# Patient Record
Sex: MALE | Race: WHITE | Employment: FULL TIME | URBAN - METROPOLITAN AREA
[De-identification: names, ages, dates, MRNs, and addresses within clinical notes are randomized per-mention and may not be internally consistent; named-entity substitution may affect disease eponyms.]

---

## 2018-04-10 ENCOUNTER — APPOINTMENT (OUTPATIENT)
Dept: GENERAL RADIOLOGY | Facility: CLINIC | Age: 23
End: 2018-04-10
Attending: EMERGENCY MEDICINE

## 2018-04-10 ENCOUNTER — HOSPITAL ENCOUNTER (EMERGENCY)
Facility: CLINIC | Age: 23
Discharge: HOME | End: 2018-04-11
Attending: EMERGENCY MEDICINE | Admitting: EMERGENCY MEDICINE

## 2018-04-10 DIAGNOSIS — F33.0 MILD EPISODE OF RECURRENT MAJOR DEPRESSIVE DISORDER (H): ICD-10-CM

## 2018-04-10 LAB
ALBUMIN SERPL-MCNC: 4.2 G/DL (ref 3.4–5)
ALBUMIN UR-MCNC: 10 MG/DL
ALP SERPL-CCNC: 105 U/L (ref 40–150)
ALT SERPL W P-5'-P-CCNC: 32 U/L (ref 0–70)
AMPHETAMINES UR QL SCN: POSITIVE
ANION GAP SERPL CALCULATED.3IONS-SCNC: 7 MMOL/L (ref 3–14)
APAP SERPL-MCNC: <2 MG/L (ref 10–20)
APPEARANCE UR: CLEAR
AST SERPL W P-5'-P-CCNC: 24 U/L (ref 0–45)
BARBITURATES UR QL: NEGATIVE
BASOPHILS # BLD AUTO: 0 10E9/L (ref 0–0.2)
BASOPHILS NFR BLD AUTO: 0.1 %
BENZODIAZ UR QL: NEGATIVE
BILIRUB SERPL-MCNC: 0.4 MG/DL (ref 0.2–1.3)
BILIRUB UR QL STRIP: NEGATIVE
BUN SERPL-MCNC: 14 MG/DL (ref 7–30)
CALCIUM SERPL-MCNC: 9.3 MG/DL (ref 8.5–10.1)
CANNABINOIDS UR QL SCN: POSITIVE
CHLORIDE SERPL-SCNC: 104 MMOL/L (ref 94–109)
CK SERPL-CCNC: 312 U/L (ref 30–300)
CK SERPL-CCNC: 417 U/L (ref 30–300)
CO2 SERPL-SCNC: 29 MMOL/L (ref 20–32)
COCAINE UR QL: NEGATIVE
COLOR UR AUTO: YELLOW
CREAT SERPL-MCNC: 0.77 MG/DL (ref 0.66–1.25)
DIFFERENTIAL METHOD BLD: NORMAL
EOSINOPHIL # BLD AUTO: 0 10E9/L (ref 0–0.7)
EOSINOPHIL NFR BLD AUTO: 0.4 %
ERYTHROCYTE [DISTWIDTH] IN BLOOD BY AUTOMATED COUNT: 13 % (ref 10–15)
ETHANOL SERPL-MCNC: <0.01 G/DL
ETHANOL UR QL SCN: NEGATIVE
GFR SERPL CREATININE-BSD FRML MDRD: >90 ML/MIN/1.7M2
GLUCOSE SERPL-MCNC: 88 MG/DL (ref 70–99)
GLUCOSE UR STRIP-MCNC: NEGATIVE MG/DL
HCT VFR BLD AUTO: 50.4 % (ref 40–53)
HGB BLD-MCNC: 17.2 G/DL (ref 13.3–17.7)
HGB UR QL STRIP: NEGATIVE
IMM GRANULOCYTES # BLD: 0 10E9/L (ref 0–0.4)
IMM GRANULOCYTES NFR BLD: 0.3 %
KETONES UR STRIP-MCNC: NEGATIVE MG/DL
LEUKOCYTE ESTERASE UR QL STRIP: NEGATIVE
LYMPHOCYTES # BLD AUTO: 1.3 10E9/L (ref 0.8–5.3)
LYMPHOCYTES NFR BLD AUTO: 18 %
MCH RBC QN AUTO: 31.5 PG (ref 26.5–33)
MCHC RBC AUTO-ENTMCNC: 34.1 G/DL (ref 31.5–36.5)
MCV RBC AUTO: 92 FL (ref 78–100)
MONOCYTES # BLD AUTO: 0.4 10E9/L (ref 0–1.3)
MONOCYTES NFR BLD AUTO: 6.3 %
NEUTROPHILS # BLD AUTO: 5.3 10E9/L (ref 1.6–8.3)
NEUTROPHILS NFR BLD AUTO: 74.9 %
NITRATE UR QL: NEGATIVE
NRBC # BLD AUTO: 0 10*3/UL
NRBC BLD AUTO-RTO: 0 /100
OPIATES UR QL SCN: NEGATIVE
PH UR STRIP: 6.5 PH (ref 5–7)
PLATELET # BLD AUTO: 245 10E9/L (ref 150–450)
POTASSIUM SERPL-SCNC: 4 MMOL/L (ref 3.4–5.3)
PROT SERPL-MCNC: 8.2 G/DL (ref 6.8–8.8)
RBC # BLD AUTO: 5.46 10E12/L (ref 4.4–5.9)
RBC #/AREA URNS AUTO: 1 /HPF (ref 0–2)
SALICYLATES SERPL-MCNC: <2 MG/DL
SODIUM SERPL-SCNC: 140 MMOL/L (ref 133–144)
SOURCE: ABNORMAL
SP GR UR STRIP: 1.02 (ref 1–1.03)
UROBILINOGEN UR STRIP-MCNC: NORMAL MG/DL (ref 0–2)
WBC # BLD AUTO: 7 10E9/L (ref 4–11)
WBC #/AREA URNS AUTO: 1 /HPF (ref 0–5)

## 2018-04-10 PROCEDURE — 87591 N.GONORRHOEAE DNA AMP PROB: CPT | Performed by: EMERGENCY MEDICINE

## 2018-04-10 PROCEDURE — 80329 ANALGESICS NON-OPIOID 1 OR 2: CPT | Performed by: EMERGENCY MEDICINE

## 2018-04-10 PROCEDURE — 87491 CHLMYD TRACH DNA AMP PROBE: CPT | Performed by: EMERGENCY MEDICINE

## 2018-04-10 PROCEDURE — 71045 X-RAY EXAM CHEST 1 VIEW: CPT

## 2018-04-10 PROCEDURE — 99285 EMERGENCY DEPT VISIT HI MDM: CPT | Mod: 25 | Performed by: EMERGENCY MEDICINE

## 2018-04-10 PROCEDURE — 93005 ELECTROCARDIOGRAM TRACING: CPT | Performed by: EMERGENCY MEDICINE

## 2018-04-10 PROCEDURE — 80307 DRUG TEST PRSMV CHEM ANLYZR: CPT | Performed by: EMERGENCY MEDICINE

## 2018-04-10 PROCEDURE — 80320 DRUG SCREEN QUANTALCOHOLS: CPT | Performed by: EMERGENCY MEDICINE

## 2018-04-10 PROCEDURE — 96361 HYDRATE IV INFUSION ADD-ON: CPT | Performed by: EMERGENCY MEDICINE

## 2018-04-10 PROCEDURE — 25000128 H RX IP 250 OP 636: Performed by: EMERGENCY MEDICINE

## 2018-04-10 PROCEDURE — 80053 COMPREHEN METABOLIC PANEL: CPT | Performed by: EMERGENCY MEDICINE

## 2018-04-10 PROCEDURE — 81001 URINALYSIS AUTO W/SCOPE: CPT | Performed by: EMERGENCY MEDICINE

## 2018-04-10 PROCEDURE — 93010 ELECTROCARDIOGRAM REPORT: CPT | Mod: Z6 | Performed by: EMERGENCY MEDICINE

## 2018-04-10 PROCEDURE — 99284 EMERGENCY DEPT VISIT MOD MDM: CPT | Mod: 25 | Performed by: EMERGENCY MEDICINE

## 2018-04-10 PROCEDURE — 85025 COMPLETE CBC W/AUTO DIFF WBC: CPT | Performed by: EMERGENCY MEDICINE

## 2018-04-10 PROCEDURE — 82550 ASSAY OF CK (CPK): CPT | Performed by: EMERGENCY MEDICINE

## 2018-04-10 PROCEDURE — 96360 HYDRATION IV INFUSION INIT: CPT | Performed by: EMERGENCY MEDICINE

## 2018-04-10 RX ADMIN — SODIUM CHLORIDE 1000 ML: 9 INJECTION, SOLUTION INTRAVENOUS at 16:43

## 2018-04-10 RX ADMIN — SODIUM CHLORIDE 1000 ML: 9 INJECTION, SOLUTION INTRAVENOUS at 17:38

## 2018-04-10 ASSESSMENT — ENCOUNTER SYMPTOMS
COLOR CHANGE: 0
ARTHRALGIAS: 0
DIFFICULTY URINATING: 0
HEADACHES: 0
DYSPHORIC MOOD: 1
FEVER: 0
SHORTNESS OF BREATH: 0
EYE REDNESS: 0
NECK STIFFNESS: 0
ABDOMINAL PAIN: 0
CONFUSION: 0

## 2018-04-10 NOTE — ED PROVIDER NOTES
History     Chief Complaint   Patient presents with     Suicide Attempt     About 10 pills of D-amphetamine 30 mg and about 3 of Methylphenidate 20  mg tablets. Took pills around 3 pm today.      HPI  Hermes Allen is a 22 year old male who to the emergency department via EMS after intentional ingestion as a suicide attempt of 10 d-amphetamine 30 mg and 3 methylphenidate 20 mg.  Patient states he took the ingestion approximately 1-1/2 hours prior to arrival here in the emergency department.  He states that he was texting with his ex-girlfriend and indicated that he was going to harm himself.  She subsequently called his brother who checked on the patient and called EMS.  Patient states that his ingestion was a suicide attempt.  He states he also took 30 melatonin tablets last night to in the morning as a suicide attempt as well.  He states he slept for approximately 8 hours.  He reports ongoing and worsening symptoms of depression for the past 2 months.  He cites relationship problems including breakup with his girlfriend and work-related problems including transportation to work issues as ongoing stressors in his life.  He does not currently have any outpatient mental health providers.  He denies taking any medications for depression currently although he did 2 years ago.  Patient repairs that he currently smokes cigarettes, babies, and smokes marijuana.  He denies other drug use.  He denies alcohol use.  Patient states he has had a cough productive of sputum for the past 2 months.  He denies any fever.  He denies chest pain and dyspnea.  Patient denies any paranoia or hallucinations.  Denies any nausea or vomiting.  He denies abdominal pain.  He denies any recent fall or injury.    The patient is also requesting evaluation for chlamydia.  He denies any symptoms of dysuria, penile pain or lesions, or penile discharge but does state that a previous sexual partner of his recently told him that she was positive for  chlamydia.    I have reviewed the Medications, Allergies, Past Medical and Surgical History, and Social History in the Epic system.    Review of Systems   Constitutional: Negative for fever.   HENT: Negative for congestion.    Eyes: Negative for redness.   Respiratory: Negative for shortness of breath.    Cardiovascular: Negative for chest pain.   Gastrointestinal: Negative for abdominal pain.   Genitourinary: Negative for difficulty urinating.   Musculoskeletal: Negative for arthralgias and neck stiffness.   Skin: Negative for color change.   Neurological: Negative for headaches.   Psychiatric/Behavioral: Positive for dysphoric mood, self-injury and suicidal ideas. Negative for confusion.   All other systems reviewed and are negative.      Physical Exam   BP: (!) 141/92  Pulse: 88  Heart Rate: 77  Temp: 97.9  F (36.6  C)  Resp: 16  SpO2: 98 %      Physical Exam   Constitutional: He is oriented to person, place, and time. He appears well-developed and well-nourished. No distress.   HENT:   Head: Normocephalic and atraumatic.   Mouth/Throat: Oropharynx is clear and moist. No oropharyngeal exudate.   Eyes: Pupils are equal, round, and reactive to light. No scleral icterus.   Neck: Normal range of motion. Neck supple.   Cardiovascular: Normal rate, regular rhythm, normal heart sounds and intact distal pulses.    Pulmonary/Chest: Effort normal and breath sounds normal. No respiratory distress.   Abdominal: Soft. Bowel sounds are normal. There is no tenderness.   Musculoskeletal: Normal range of motion. He exhibits no edema or tenderness.   Neurological: He is alert and oriented to person, place, and time. He has normal strength. He displays no tremor. No cranial nerve deficit. He exhibits normal muscle tone. Coordination normal.   Skin: Skin is warm. No rash noted. He is not diaphoretic.   Psychiatric: His speech is normal. His affect is blunt. He is withdrawn. He expresses suicidal ideation. He expresses suicidal  plans.   Nursing note and vitals reviewed.        ED Course     ED Course     Pill bottles examined- d-amphetamine bottle label smudged but appears to be XR.    Procedures             EKG Interpretation:      Interpreted by YONATAN MOJICA MD  Time reviewed: 1630  Symptoms at time of EKG: ingestion, asymptomatic   Rhythm: normal sinus with sinus arrythmia  Rate: 92  Axis: normal  Ectopy: none  Conduction: normal  ST Segments/ T Waves: No ST-T wave changes  Q Waves: none  Comparison to prior: No old EKG available    Clinical Impression: normal EKG    Results for orders placed or performed during the hospital encounter of 04/10/18 (from the past 24 hour(s))   EKG 12 lead   Result Value Ref Range    Interpretation ECG Click View Image link to view waveform and result    CBC with platelets differential   Result Value Ref Range    WBC 7.0 4.0 - 11.0 10e9/L    RBC Count 5.46 4.4 - 5.9 10e12/L    Hemoglobin 17.2 13.3 - 17.7 g/dL    Hematocrit 50.4 40.0 - 53.0 %    MCV 92 78 - 100 fl    MCH 31.5 26.5 - 33.0 pg    MCHC 34.1 31.5 - 36.5 g/dL    RDW 13.0 10.0 - 15.0 %    Platelet Count 245 150 - 450 10e9/L    Diff Method Automated Method     % Neutrophils 74.9 %    % Lymphocytes 18.0 %    % Monocytes 6.3 %    % Eosinophils 0.4 %    % Basophils 0.1 %    % Immature Granulocytes 0.3 %    Nucleated RBCs 0 0 /100    Absolute Neutrophil 5.3 1.6 - 8.3 10e9/L    Absolute Lymphocytes 1.3 0.8 - 5.3 10e9/L    Absolute Monocytes 0.4 0.0 - 1.3 10e9/L    Absolute Eosinophils 0.0 0.0 - 0.7 10e9/L    Absolute Basophils 0.0 0.0 - 0.2 10e9/L    Abs Immature Granulocytes 0.0 0 - 0.4 10e9/L    Absolute Nucleated RBC 0.0    Comprehensive metabolic panel   Result Value Ref Range    Sodium 140 133 - 144 mmol/L    Potassium 4.0 3.4 - 5.3 mmol/L    Chloride 104 94 - 109 mmol/L    Carbon Dioxide 29 20 - 32 mmol/L    Anion Gap 7 3 - 14 mmol/L    Glucose 88 70 - 99 mg/dL    Urea Nitrogen 14 7 - 30 mg/dL    Creatinine 0.77 0.66 - 1.25 mg/dL    GFR Estimate  >90 >60 mL/min/1.7m2    GFR Estimate If Black >90 >60 mL/min/1.7m2    Calcium 9.3 8.5 - 10.1 mg/dL    Bilirubin Total 0.4 0.2 - 1.3 mg/dL    Albumin 4.2 3.4 - 5.0 g/dL    Protein Total 8.2 6.8 - 8.8 g/dL    Alkaline Phosphatase 105 40 - 150 U/L    ALT 32 0 - 70 U/L    AST 24 0 - 45 U/L   Alcohol level blood   Result Value Ref Range    Ethanol g/dL <0.01 <0.01 g/dL   Acetaminophen level   Result Value Ref Range    Acetaminophen Level <2 mg/L   Salicylate level   Result Value Ref Range    Salicylate Level <2 mg/dL   CK total   Result Value Ref Range    CK Total 417 (H) 30 - 300 U/L   Chest  XR, 1 view portable    Narrative    CHEST ONE VIEW PORTABLE   4/10/2018 5:01 PM     HISTORY: Cough.    COMPARISON: None.      Impression    IMPRESSION: Normal.    THA RAGLAND MD   Drug abuse screen 6 urine (tox)   Result Value Ref Range    Amphetamine Qual Urine Positive (A) NEG^Negative    Barbiturates Qual Urine Negative NEG^Negative    Benzodiazepine Qual Urine Negative NEG^Negative    Cannabinoids Qual Urine Positive (A) NEG^Negative    Cocaine Qual Urine Negative NEG^Negative    Ethanol Qual Urine Negative NEG^Negative    Opiates Qualitative Urine Negative NEG^Negative   UA with Microscopic reflex to Culture   Result Value Ref Range    Color Urine Yellow     Appearance Urine Clear     Glucose Urine Negative NEG^Negative mg/dL    Bilirubin Urine Negative NEG^Negative    Ketones Urine Negative NEG^Negative mg/dL    Specific Gravity Urine 1.018 1.003 - 1.035    Blood Urine Negative NEG^Negative    pH Urine 6.5 5.0 - 7.0 pH    Protein Albumin Urine 10 (A) NEG^Negative mg/dL    Urobilinogen mg/dL Normal 0.0 - 2.0 mg/dL    Nitrite Urine Negative NEG^Negative    Leukocyte Esterase Urine Negative NEG^Negative    Source Unspecified Urine     WBC Urine 1 0 - 5 /HPF    RBC Urine 1 0 - 2 /HPF   CK total   Result Value Ref Range    CK Total 312 (H) 30 - 300 U/L        Medications   nicotine polacrilex (NICORETTE) gum 2 mg (not  administered)   0.9% sodium chloride BOLUS (0 mLs Intravenous Stopped 4/10/18 1729)   0.9% sodium chloride BOLUS (0 mLs Intravenous Stopped 4/10/18 1848)          Critical Care time:    10:17 PM Patient reassessed.  He reports feeling mildly anxious but denies any alteration in his thought process.  He denies any nausea or vomiting.  No chest pain.  Patient is awake and alert.  He is conversant.  He has normal speech.  He has normal tone.  No hyperreflexia.  Patient states that he would like to get the help he needs but does not necessarily feel like he has ongoing suicide ideation.  [facility] assessment requested.    Seen by [facility] - see note for complete details.       Assessments & Plan (with Medical Decision Making)   22 year old male with history of depression to the emergency department after nontoxic ingestion.  The patient did not ever manifest clinical signs or symptoms consistent with significant stimulant ingestion.  He did have a mildly elevated CK level initially which improved with time and IV fluids.  The patient denied ongoing suicidal ideations upon presentation to the emergency department and maintained that he was not suicidal throughout his emergency department stay.  He was medically cleared after 7 hour period of observation.  He was seen by the [facility] .  The patient continues to deny ongoing suicide ideations.  Attempts to obtain his insurance information by his mother thus far has been unsuccessful.  Plan is for patient to be referred for day treatment.  She will be discharged from the emergency department.  Mental health will contact him tomorrow to help facilitate outpatient follow-up.    I have reviewed the nursing notes.    I have reviewed the findings, diagnosis, plan and need for follow up with the patient.    New Prescriptions    No medications on file       Final diagnoses:   Drug ingestion, undetermined intent, initial encounter   Mild episode of recurrent major depressive  disorder (H)       4/10/2018   [facility], [place], EMERGENCY DEPARTMENT     Anthony Richardson MD  04/11/18 0006       Anthony Richardson MD  04/11/18 0012       Anthony Richardson MD  04/11/18 0013

## 2018-04-10 NOTE — ED AVS SNAPSHOT
[facility], Emergency Department    [address]    [place] [address]    Phone:  [phone]    Fax:  [phone]                                       Hermes Allen   MRN: [id number]    Department:  [facility], Emergency Department   Date of Visit:  4/10/2018           Patient Information     Date Of Birth          1995        Your diagnoses for this visit were:     Drug ingestion, undetermined intent, initial encounter     Mild episode of recurrent major depressive disorder (H)        You were seen by Anthony Richardson MD.        Discharge Instructions       Follow-up for Day Treatment Intake- Mental Health Intake will contact you tomorrow to help arrange.    Return if you feel unsafe or for other concerns.    24 Hour Appointment Hotline       To make an appointment at any [facility] clinic, call [phone] ([phone]). If you don't have a family doctor or clinic, we will help you find one. [facility] clinics are conveniently located to serve the needs of you and your family.             Review of your medicines      Our records show that you are taking the medicines listed below. If these are incorrect, please call your family doctor or clinic.        Dose / Directions Last dose taken    sertraline 50 MG tablet   Commonly known as:  ZOLOFT   Dose:  50 mg   Quantity:  30 tablet        Take 1 tablet (50 mg) by mouth daily   Refills:  0                Procedures and tests performed during your visit     Procedure/Test Number of Times Performed    Acetaminophen level 1    Alcohol level blood 1    CBC with platelets differential 1    CK total 2    Chest  XR, 1 view portable 1    Chlamydia trachomatis PCR 1    Comprehensive metabolic panel 1    Drug abuse screen 6 urine (tox) 1    EKG 12 lead 1    Neisseria gonorrhoeae PCR 1    Salicylate level 1    UA with Microscopic reflex to Culture 1      Orders Needing Specimen Collection     None      Pending Results     Date and Time Order Name Status  "Description    4/10/2018 1707 Chlamydia trachomatis PCR In process     4/10/2018 1707 Neisseria gonorrhoeae PCR In process     4/10/2018 1615 EKG 12 lead Preliminary             Pending Culture Results     Date and Time Order Name Status Description    4/10/2018 1707 Chlamydia trachomatis PCR In process     4/10/2018 1707 Neisseria gonorrhoeae PCR In process             Pending Results Instructions     If you had any lab results that were not finalized at the time of your Discharge, you can call the ED Lab Result RN at [phone]. You will be contacted by this team for any positive Lab results or changes in treatment. The nurses are available 7 days a week from 10A to 6:30P.  You can leave a message 24 hours per day and they will return your call.        Thank you for choosing [facility]       Thank you for choosing [facility] for your care. Our goal is always to provide you with excellent care. Hearing back from our patients is one way we can continue to improve our services. Please take a few minutes to complete the written survey that you may receive in the mail after you visit with us. Thank you!        [facility] Information     [facility] lets you send messages to your doctor, view your test results, renew your prescriptions, schedule appointments and more. To sign up, go to [email or web address] . Click on \"Log in\" on the left side of the screen, which will take you to the Welcome page. Then click on \"Sign up Now\" on the right side of the page.     You will be asked to enter the access code listed below, as well as some personal information. Please follow the directions to create your username and password.     Your access code is: [email or web address]  Expires: 7/10/2018 12:13 AM     Your access code will  in 90 days. If you need help or a new code, please call your [facility] clinic or [phone].        Care EveryWhere ID     This is your Care EveryWhere ID. This could be used by other organizations to access your " [facility] medical records  [id number]        Equal Access to Services     DREW NELSON : Hadii gifty Farmer, theresa lovett, hoda elam. So [facility] [phone].    ATENCIÓN: Si habla español, tiene a hernandez disposición servicios gratuitos de asistencia lingüística. Llame al [phone].    We comply with applicable federal civil rights laws and [place] laws. We do not discriminate on the basis of race, color, national origin, age, disability, sex, sexual orientation, or gender identity.            After Visit Summary       This is your record. Keep this with you and show to your community pharmacist(s) and doctor(s) at your next visit.

## 2018-04-10 NOTE — ED AVS SNAPSHOT
[facility], [place], Emergency Department    [address]    [place] [address]    Phone:  [phone]    Fax:  [phone]                                       Hermes Allen   MRN: [id number]    Department:  [facility], Emergency Department   Date of Visit:  4/10/2018           After Visit Summary Signature Page     I have received my discharge instructions, and my questions have been answered. I have discussed any challenges I see with this plan with the nurse or doctor.    ..........................................................................................................................................  Patient/Patient Representative Signature      ..........................................................................................................................................  Patient Representative Print Name and Relationship to Patient    ..................................................               ................................................  Date                                            Time    ..........................................................................................................................................  Reviewed by Signature/Title    ...................................................              ..............................................  Date                                                            Time

## 2018-04-11 VITALS
SYSTOLIC BLOOD PRESSURE: 156 MMHG | RESPIRATION RATE: 18 BRPM | DIASTOLIC BLOOD PRESSURE: 59 MMHG | HEART RATE: 96 BPM | OXYGEN SATURATION: 96 % | TEMPERATURE: 97.6 F

## 2018-04-11 LAB
C TRACH DNA SPEC QL NAA+PROBE: NEGATIVE
INTERPRETATION ECG - MUSE: NORMAL
N GONORRHOEA DNA SPEC QL NAA+PROBE: NEGATIVE
SPECIMEN SOURCE: NORMAL
SPECIMEN SOURCE: NORMAL

## 2018-04-11 PROCEDURE — 25000132 ZZH RX MED GY IP 250 OP 250 PS 637: Performed by: EMERGENCY MEDICINE

## 2018-04-11 RX ADMIN — NICOTINE POLACRILEX 2 MG: 2 GUM, CHEWING ORAL at 00:15

## 2018-04-11 NOTE — DISCHARGE INSTRUCTIONS
Follow-up for Day Treatment Intake- Mental Health Intake will contact you tomorrow to help arrange.    Return if you feel unsafe or for other concerns.

## 2019-01-02 ENCOUNTER — HOSPITAL ENCOUNTER (OUTPATIENT)
Facility: CLINIC | Age: 24
Setting detail: OBSERVATION
Discharge: PSYCHIATRIC OR SUBSTANCE USE FACILITY | End: 2019-01-04
Attending: EMERGENCY MEDICINE | Admitting: INTERNAL MEDICINE

## 2019-01-02 DIAGNOSIS — T50.902A SUICIDE ATTEMPT BY DRUG INGESTION, INITIAL ENCOUNTER (H): ICD-10-CM

## 2019-01-02 DIAGNOSIS — F19.10 SUBSTANCE ABUSE (H): Primary | ICD-10-CM

## 2019-01-02 DIAGNOSIS — R45.851 SUICIDAL IDEATION: ICD-10-CM

## 2019-01-02 DIAGNOSIS — T42.4X2A INTENTIONAL CHLORDIAZEPOXIDE OVERDOSE, INITIAL ENCOUNTER (H): ICD-10-CM

## 2019-01-02 DIAGNOSIS — R53.83 OTHER FATIGUE: ICD-10-CM

## 2019-01-02 PROBLEM — R55 SYNCOPE: Status: ACTIVE | Noted: 2019-01-02

## 2019-01-02 LAB
ALBUMIN SERPL-MCNC: 4.2 G/DL (ref 3.4–5)
ALCOHOL BREATH TEST: 0.06 (ref 0–0.01)
ALP SERPL-CCNC: 80 U/L (ref 40–150)
ALT SERPL W P-5'-P-CCNC: 16 U/L (ref 0–70)
ANION GAP SERPL CALCULATED.3IONS-SCNC: 10 MMOL/L (ref 3–14)
ANION GAP SERPL CALCULATED.3IONS-SCNC: 8 MMOL/L (ref 3–14)
APAP SERPL-MCNC: <2 MG/L (ref 10–20)
APTT PPP: 27 SEC (ref 22–37)
AST SERPL W P-5'-P-CCNC: 17 U/L (ref 0–45)
BASE EXCESS BLDV CALC-SCNC: 3.7 MMOL/L
BASOPHILS # BLD AUTO: 0 10E9/L (ref 0–0.2)
BASOPHILS # BLD AUTO: 0 10E9/L (ref 0–0.2)
BASOPHILS NFR BLD AUTO: 0.3 %
BASOPHILS NFR BLD AUTO: 0.3 %
BILIRUB SERPL-MCNC: 0.5 MG/DL (ref 0.2–1.3)
BUN SERPL-MCNC: 16 MG/DL (ref 7–30)
BUN SERPL-MCNC: 16 MG/DL (ref 7–30)
CALCIUM SERPL-MCNC: 9 MG/DL (ref 8.5–10.1)
CALCIUM SERPL-MCNC: 9.1 MG/DL (ref 8.5–10.1)
CHLORIDE SERPL-SCNC: 103 MMOL/L (ref 94–109)
CHLORIDE SERPL-SCNC: 105 MMOL/L (ref 94–109)
CO2 SERPL-SCNC: 28 MMOL/L (ref 20–32)
CO2 SERPL-SCNC: 28 MMOL/L (ref 20–32)
CREAT SERPL-MCNC: 0.88 MG/DL (ref 0.66–1.25)
CREAT SERPL-MCNC: 1.01 MG/DL (ref 0.66–1.25)
DIFFERENTIAL METHOD BLD: NORMAL
DIFFERENTIAL METHOD BLD: NORMAL
EOSINOPHIL # BLD AUTO: 0.1 10E9/L (ref 0–0.7)
EOSINOPHIL # BLD AUTO: 0.1 10E9/L (ref 0–0.7)
EOSINOPHIL NFR BLD AUTO: 0.7 %
EOSINOPHIL NFR BLD AUTO: 1.2 %
ERYTHROCYTE [DISTWIDTH] IN BLOOD BY AUTOMATED COUNT: 12.5 % (ref 10–15)
ERYTHROCYTE [DISTWIDTH] IN BLOOD BY AUTOMATED COUNT: 12.6 % (ref 10–15)
GFR SERPL CREATININE-BSD FRML MDRD: >90 ML/MIN/{1.73_M2}
GFR SERPL CREATININE-BSD FRML MDRD: >90 ML/MIN/{1.73_M2}
GLUCOSE BLDC GLUCOMTR-MCNC: 88 MG/DL (ref 70–99)
GLUCOSE SERPL-MCNC: 80 MG/DL (ref 70–99)
GLUCOSE SERPL-MCNC: 88 MG/DL (ref 70–99)
HCO3 BLDV-SCNC: 29 MMOL/L (ref 21–28)
HCT VFR BLD AUTO: 47.7 % (ref 40–53)
HCT VFR BLD AUTO: 49.1 % (ref 40–53)
HGB BLD-MCNC: 17.1 G/DL (ref 13.3–17.7)
HGB BLD-MCNC: 17.3 G/DL (ref 13.3–17.7)
IMM GRANULOCYTES # BLD: 0 10E9/L (ref 0–0.4)
IMM GRANULOCYTES # BLD: 0 10E9/L (ref 0–0.4)
IMM GRANULOCYTES NFR BLD: 0 %
IMM GRANULOCYTES NFR BLD: 0.1 %
INR PPP: 0.96 (ref 0.86–1.14)
LACTATE BLD-SCNC: 1.1 MMOL/L (ref 0.7–2)
LYMPHOCYTES # BLD AUTO: 2 10E9/L (ref 0.8–5.3)
LYMPHOCYTES # BLD AUTO: 3.1 10E9/L (ref 0.8–5.3)
LYMPHOCYTES NFR BLD AUTO: 26.9 %
LYMPHOCYTES NFR BLD AUTO: 41.2 %
MAGNESIUM SERPL-MCNC: 1.9 MG/DL (ref 1.6–2.3)
MCH RBC QN AUTO: 32.6 PG (ref 26.5–33)
MCH RBC QN AUTO: 33 PG (ref 26.5–33)
MCHC RBC AUTO-ENTMCNC: 35.2 G/DL (ref 31.5–36.5)
MCHC RBC AUTO-ENTMCNC: 35.8 G/DL (ref 31.5–36.5)
MCV RBC AUTO: 92 FL (ref 78–100)
MCV RBC AUTO: 93 FL (ref 78–100)
MONOCYTES # BLD AUTO: 0.4 10E9/L (ref 0–1.3)
MONOCYTES # BLD AUTO: 0.5 10E9/L (ref 0–1.3)
MONOCYTES NFR BLD AUTO: 5.4 %
MONOCYTES NFR BLD AUTO: 6.8 %
NEUTROPHILS # BLD AUTO: 3.9 10E9/L (ref 1.6–8.3)
NEUTROPHILS # BLD AUTO: 5.1 10E9/L (ref 1.6–8.3)
NEUTROPHILS NFR BLD AUTO: 50.4 %
NEUTROPHILS NFR BLD AUTO: 66.7 %
NRBC # BLD AUTO: 0 10*3/UL
NRBC # BLD AUTO: 0 10*3/UL
NRBC BLD AUTO-RTO: 0 /100
NRBC BLD AUTO-RTO: 0 /100
O2/TOTAL GAS SETTING VFR VENT: ABNORMAL %
OXYHGB MFR BLDV: 95 %
PCO2 BLDV: 43 MM HG (ref 40–50)
PH BLDV: 7.43 PH (ref 7.32–7.43)
PHOSPHATE SERPL-MCNC: 4.7 MG/DL (ref 2.5–4.5)
PLATELET # BLD AUTO: 223 10E9/L (ref 150–450)
PLATELET # BLD AUTO: 249 10E9/L (ref 150–450)
PO2 BLDV: 77 MM HG (ref 25–47)
POTASSIUM SERPL-SCNC: 3.8 MMOL/L (ref 3.4–5.3)
POTASSIUM SERPL-SCNC: 3.9 MMOL/L (ref 3.4–5.3)
PROT SERPL-MCNC: 7.7 G/DL (ref 6.8–8.8)
RBC # BLD AUTO: 5.18 10E12/L (ref 4.4–5.9)
RBC # BLD AUTO: 5.3 10E12/L (ref 4.4–5.9)
SALICYLATES SERPL-MCNC: <2 MG/DL
SODIUM SERPL-SCNC: 141 MMOL/L (ref 133–144)
SODIUM SERPL-SCNC: 141 MMOL/L (ref 133–144)
WBC # BLD AUTO: 7.6 10E9/L (ref 4–11)
WBC # BLD AUTO: 7.6 10E9/L (ref 4–11)

## 2019-01-02 PROCEDURE — 83605 ASSAY OF LACTIC ACID: CPT | Performed by: STUDENT IN AN ORGANIZED HEALTH CARE EDUCATION/TRAINING PROGRAM

## 2019-01-02 PROCEDURE — 25000132 ZZH RX MED GY IP 250 OP 250 PS 637: Performed by: EMERGENCY MEDICINE

## 2019-01-02 PROCEDURE — 99285 EMERGENCY DEPT VISIT HI MDM: CPT | Mod: 25 | Performed by: EMERGENCY MEDICINE

## 2019-01-02 PROCEDURE — 93005 ELECTROCARDIOGRAM TRACING: CPT | Performed by: EMERGENCY MEDICINE

## 2019-01-02 PROCEDURE — 85025 COMPLETE CBC W/AUTO DIFF WBC: CPT | Performed by: STUDENT IN AN ORGANIZED HEALTH CARE EDUCATION/TRAINING PROGRAM

## 2019-01-02 PROCEDURE — 80048 BASIC METABOLIC PNL TOTAL CA: CPT | Performed by: STUDENT IN AN ORGANIZED HEALTH CARE EDUCATION/TRAINING PROGRAM

## 2019-01-02 PROCEDURE — 82805 BLOOD GASES W/O2 SATURATION: CPT | Performed by: STUDENT IN AN ORGANIZED HEALTH CARE EDUCATION/TRAINING PROGRAM

## 2019-01-02 PROCEDURE — 12000001 ZZH R&B MED SURG/OB UMMC

## 2019-01-02 PROCEDURE — 82075 ASSAY OF BREATH ETHANOL: CPT | Performed by: EMERGENCY MEDICINE

## 2019-01-02 PROCEDURE — 85730 THROMBOPLASTIN TIME PARTIAL: CPT | Performed by: STUDENT IN AN ORGANIZED HEALTH CARE EDUCATION/TRAINING PROGRAM

## 2019-01-02 PROCEDURE — 85025 COMPLETE CBC W/AUTO DIFF WBC: CPT | Performed by: EMERGENCY MEDICINE

## 2019-01-02 PROCEDURE — 85610 PROTHROMBIN TIME: CPT | Performed by: STUDENT IN AN ORGANIZED HEALTH CARE EDUCATION/TRAINING PROGRAM

## 2019-01-02 PROCEDURE — 00000146 ZZHCL STATISTIC GLUCOSE BY METER IP

## 2019-01-02 PROCEDURE — 84100 ASSAY OF PHOSPHORUS: CPT | Performed by: STUDENT IN AN ORGANIZED HEALTH CARE EDUCATION/TRAINING PROGRAM

## 2019-01-02 PROCEDURE — 93010 ELECTROCARDIOGRAM REPORT: CPT | Mod: Z6 | Performed by: EMERGENCY MEDICINE

## 2019-01-02 PROCEDURE — 83735 ASSAY OF MAGNESIUM: CPT | Performed by: STUDENT IN AN ORGANIZED HEALTH CARE EDUCATION/TRAINING PROGRAM

## 2019-01-02 PROCEDURE — 80329 ANALGESICS NON-OPIOID 1 OR 2: CPT | Performed by: EMERGENCY MEDICINE

## 2019-01-02 PROCEDURE — 80053 COMPREHEN METABOLIC PANEL: CPT | Performed by: EMERGENCY MEDICINE

## 2019-01-02 RX ORDER — NALOXONE HYDROCHLORIDE 0.4 MG/ML
.1-.4 INJECTION, SOLUTION INTRAMUSCULAR; INTRAVENOUS; SUBCUTANEOUS
Status: DISCONTINUED | OUTPATIENT
Start: 2019-01-02 | End: 2019-01-04 | Stop reason: HOSPADM

## 2019-01-02 RX ORDER — LANOLIN ALCOHOL/MO/W.PET/CERES
3 CREAM (GRAM) TOPICAL
Status: DISCONTINUED | OUTPATIENT
Start: 2019-01-02 | End: 2019-01-04 | Stop reason: HOSPADM

## 2019-01-02 RX ORDER — HYDROXYZINE HYDROCHLORIDE 50 MG/1
50 TABLET, FILM COATED ORAL ONCE
Status: COMPLETED | OUTPATIENT
Start: 2019-01-02 | End: 2019-01-02

## 2019-01-02 RX ADMIN — HYDROXYZINE HYDROCHLORIDE 50 MG: 50 TABLET, FILM COATED ORAL at 20:03

## 2019-01-02 ASSESSMENT — ENCOUNTER SYMPTOMS
NAUSEA: 0
SHORTNESS OF BREATH: 0
DIARRHEA: 0
FEVER: 0
FATIGUE: 1
VOMITING: 0

## 2019-01-02 NOTE — ED NOTES
Bed: HW02  Expected date: 1/2/19  Expected time: 5:48 PM  Means of arrival: Ambulance  [place]  [facility] [phone]

## 2019-01-03 ENCOUNTER — APPOINTMENT (OUTPATIENT)
Dept: CT IMAGING | Facility: CLINIC | Age: 24
End: 2019-01-03

## 2019-01-03 LAB
AMPHETAMINES UR QL SCN: NEGATIVE
BARBITURATES UR QL: NEGATIVE
BENZODIAZ UR QL: NEGATIVE
CANNABINOIDS UR QL SCN: POSITIVE
CK SERPL-CCNC: 96 U/L (ref 30–300)
COCAINE UR QL: POSITIVE
ERYTHROCYTE [DISTWIDTH] IN BLOOD BY AUTOMATED COUNT: 13 % (ref 10–15)
ETHANOL UR QL SCN: NEGATIVE
HCT VFR BLD AUTO: 48.7 % (ref 40–53)
HGB BLD-MCNC: 16.8 G/DL (ref 13.3–17.7)
INTERPRETATION ECG - MUSE: NORMAL
MCH RBC QN AUTO: 32.8 PG (ref 26.5–33)
MCHC RBC AUTO-ENTMCNC: 34.5 G/DL (ref 31.5–36.5)
MCV RBC AUTO: 95 FL (ref 78–100)
OPIATES UR QL SCN: NEGATIVE
PLATELET # BLD AUTO: 201 10E9/L (ref 150–450)
RBC # BLD AUTO: 5.12 10E12/L (ref 4.4–5.9)
TROPONIN I SERPL-MCNC: <0.015 UG/L (ref 0–0.04)
TSH SERPL DL<=0.005 MIU/L-ACNC: 1.81 MU/L (ref 0.4–4)
WBC # BLD AUTO: 5.6 10E9/L (ref 4–11)

## 2019-01-03 PROCEDURE — 99207 ZZC CDG-CODE CATEGORY CHANGED: CPT | Performed by: INTERNAL MEDICINE

## 2019-01-03 PROCEDURE — 25000125 ZZHC RX 250: Performed by: STUDENT IN AN ORGANIZED HEALTH CARE EDUCATION/TRAINING PROGRAM

## 2019-01-03 PROCEDURE — 36415 COLL VENOUS BLD VENIPUNCTURE: CPT | Performed by: INTERNAL MEDICINE

## 2019-01-03 PROCEDURE — 84484 ASSAY OF TROPONIN QUANT: CPT | Performed by: INTERNAL MEDICINE

## 2019-01-03 PROCEDURE — 96360 HYDRATION IV INFUSION INIT: CPT

## 2019-01-03 PROCEDURE — 25000132 ZZH RX MED GY IP 250 OP 250 PS 637: Performed by: STUDENT IN AN ORGANIZED HEALTH CARE EDUCATION/TRAINING PROGRAM

## 2019-01-03 PROCEDURE — G0378 HOSPITAL OBSERVATION PER HR: HCPCS

## 2019-01-03 PROCEDURE — 25000132 ZZH RX MED GY IP 250 OP 250 PS 637: Performed by: SPECIALIST

## 2019-01-03 PROCEDURE — 99220 ZZC INITIAL OBSERVATION CARE,LEVL III: CPT | Mod: AI | Performed by: INTERNAL MEDICINE

## 2019-01-03 PROCEDURE — 25000128 H RX IP 250 OP 636

## 2019-01-03 PROCEDURE — 84443 ASSAY THYROID STIM HORMONE: CPT | Performed by: INTERNAL MEDICINE

## 2019-01-03 PROCEDURE — 82550 ASSAY OF CK (CPK): CPT | Performed by: INTERNAL MEDICINE

## 2019-01-03 PROCEDURE — 99221 1ST HOSP IP/OBS SF/LOW 40: CPT | Performed by: PSYCHIATRY & NEUROLOGY

## 2019-01-03 PROCEDURE — 80307 DRUG TEST PRSMV CHEM ANLYZR: CPT | Performed by: EMERGENCY MEDICINE

## 2019-01-03 PROCEDURE — 70450 CT HEAD/BRAIN W/O DYE: CPT

## 2019-01-03 PROCEDURE — 25000128 H RX IP 250 OP 636: Performed by: STUDENT IN AN ORGANIZED HEALTH CARE EDUCATION/TRAINING PROGRAM

## 2019-01-03 PROCEDURE — 96361 HYDRATE IV INFUSION ADD-ON: CPT

## 2019-01-03 PROCEDURE — 85027 COMPLETE CBC AUTOMATED: CPT | Performed by: INTERNAL MEDICINE

## 2019-01-03 PROCEDURE — 25000128 H RX IP 250 OP 636: Performed by: INTERNAL MEDICINE

## 2019-01-03 PROCEDURE — 80320 DRUG SCREEN QUANTALCOHOLS: CPT | Performed by: EMERGENCY MEDICINE

## 2019-01-03 RX ORDER — SODIUM CHLORIDE 9 MG/ML
INJECTION, SOLUTION INTRAVENOUS
Status: COMPLETED
Start: 2019-01-03 | End: 2019-01-03

## 2019-01-03 RX ORDER — FOLIC ACID 1 MG/1
1 TABLET ORAL DAILY
Status: DISCONTINUED | OUTPATIENT
Start: 2019-01-03 | End: 2019-01-04 | Stop reason: HOSPADM

## 2019-01-03 RX ORDER — LANOLIN ALCOHOL/MO/W.PET/CERES
100 CREAM (GRAM) TOPICAL DAILY
Status: DISCONTINUED | OUTPATIENT
Start: 2019-01-03 | End: 2019-01-04 | Stop reason: HOSPADM

## 2019-01-03 RX ORDER — SODIUM CHLORIDE, SODIUM LACTATE, POTASSIUM CHLORIDE, CALCIUM CHLORIDE 600; 310; 30; 20 MG/100ML; MG/100ML; MG/100ML; MG/100ML
INJECTION, SOLUTION INTRAVENOUS CONTINUOUS
Status: DISCONTINUED | OUTPATIENT
Start: 2019-01-03 | End: 2019-01-03

## 2019-01-03 RX ORDER — SODIUM CHLORIDE, SODIUM LACTATE, POTASSIUM CHLORIDE, CALCIUM CHLORIDE 600; 310; 30; 20 MG/100ML; MG/100ML; MG/100ML; MG/100ML
INJECTION, SOLUTION INTRAVENOUS CONTINUOUS
Status: DISCONTINUED | OUTPATIENT
Start: 2019-01-03 | End: 2019-01-04

## 2019-01-03 RX ORDER — LIDOCAINE 40 MG/G
CREAM TOPICAL
Status: DISCONTINUED | OUTPATIENT
Start: 2019-01-03 | End: 2019-01-04 | Stop reason: HOSPADM

## 2019-01-03 RX ORDER — LORAZEPAM 1 MG/1
1-4 TABLET ORAL EVERY 30 MIN PRN
Status: DISCONTINUED | OUTPATIENT
Start: 2019-01-03 | End: 2019-01-04 | Stop reason: HOSPADM

## 2019-01-03 RX ADMIN — Medication 100 MG: at 15:26

## 2019-01-03 RX ADMIN — SODIUM CHLORIDE, POTASSIUM CHLORIDE, SODIUM LACTATE AND CALCIUM CHLORIDE: 600; 310; 30; 20 INJECTION, SOLUTION INTRAVENOUS at 21:37

## 2019-01-03 RX ADMIN — FOLIC ACID 1 MG: 1 TABLET ORAL at 15:32

## 2019-01-03 RX ADMIN — FOLIC ACID: 5 INJECTION, SOLUTION INTRAMUSCULAR; INTRAVENOUS; SUBCUTANEOUS at 02:30

## 2019-01-03 RX ADMIN — NICOTINE POLACRILEX 2 MG: 2 GUM, CHEWING BUCCAL at 22:32

## 2019-01-03 RX ADMIN — SODIUM CHLORIDE, POTASSIUM CHLORIDE, SODIUM LACTATE AND CALCIUM CHLORIDE: 600; 310; 30; 20 INJECTION, SOLUTION INTRAVENOUS at 15:04

## 2019-01-03 RX ADMIN — SODIUM CHLORIDE 1000 ML: 9 INJECTION, SOLUTION INTRAVENOUS at 09:40

## 2019-01-03 ASSESSMENT — ACTIVITIES OF DAILY LIVING (ADL)
ADLS_ACUITY_SCORE: 15
RETIRED_EATING: 0-->INDEPENDENT
NUMBER_OF_TIMES_PATIENT_HAS_FALLEN_WITHIN_LAST_SIX_MONTHS: 1
AMBULATION: 0-->INDEPENDENT
ADLS_ACUITY_SCORE: 15
ADLS_ACUITY_SCORE: 15
DRESS: 0-->INDEPENDENT
BATHING: 0-->INDEPENDENT
TRANSFERRING: 0-->INDEPENDENT
SWALLOWING: 0-->SWALLOWS FOODS/LIQUIDS WITHOUT DIFFICULTY
FALL_HISTORY_WITHIN_LAST_SIX_MONTHS: YES
RETIRED_COMMUNICATION: 2-->DIFFICULTY UNDERSTANDING AND SPEAKING (NOT RELATED TO LANGUAGE BARRIER)
TOILETING: 0-->INDEPENDENT
COGNITION: 2 - DIFFICULTY WITH ORGANIZING THOUGHTS

## 2019-01-03 NOTE — H&P
Medicine Admission H&P    CC: Syncope     HPI: 23-year-old man with history of depression and multiple suicide attempts.  Presented to the emergency room after ingestion of Xanax bars (quanitity uncertain, either 2.5 or 5 bars), Percocet, injected cocaine, cannabis, alcohol (quantity also uncertain, patient very poor historian at this point).  Denies heroin or meth.  No LSD recently although he did use some in the past.  He then attempted to jump off of a bridge but was stopped by a friend and then brought to the emergency room.  In the emergency room he was noted to have stable vitals and unremarkable labs and was therefore sent to behavioral health.  Upon arrival to behavioral health patient had a syncopal episode.  He had no shaking.  No evident tongue biting.  No incontinence.  He was unresponsive to staff for about 2 minutes by their estimate and then regained consciousness.  I evaluated the patient at this time and he did not appear postictal.  Glucose was checked and was in the 80s.  Vitals were normal.  He was then admitted to medicine. The patient stood up and was then guided to sit on a chair. While on the chair, he again became unresponsive but was quickly arousable with barragan voice. He did hit his head at the initial syncopal episode.     Upon further questioning, the patient says that he attempted suicide because no one in his family cares about him or likes him.  He likes his current job and does not want to lose his job.  He could not accurately tell me if he was on the sertraline that is listed in his medication list-says he should be on a lot more medications.    PMHx- reviewed  PSHx- reviewed, none recent  FH- reviewed and non contributory to current presentation   Allergies- reviewed, NKA  Meds- reviewed, as above  Social- reviewed, as above    Past Medical History:   Diagnosis Date     ADHD (attention deficit hyperactivity disorder)      Anxiety      Depressive disorder      Past Surgical  "History:   Procedure Laterality Date     No Surgical History       Social History     Socioeconomic History     Marital status: Single     Spouse name: Not on file     Number of children: Not on file     Years of education: Not on file     Highest education level: Not on file   Social Needs     Financial resource strain: Not on file     Food insecurity - worry: Not on file     Food insecurity - inability: Not on file     Transportation needs - medical: Not on file     Transportation needs - non-medical: Not on file   Occupational History     Not on file   Tobacco Use     Smoking status: Current Every Day Smoker     Packs/day: 0.50     Smokeless tobacco: Former User     Tobacco comment: e. 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    [employer]    Works in [employer]     Current Facility-Administered Medications   Medication     folic acid (FOLVITE) tablet 1 mg     LORazepam (ATIVAN) tablet 1-4 mg     melatonin tablet 3 mg     naloxone (NARCAN) injection 0.1-0.4 mg     sodium chloride 0.9 % 1,000 mL with INFUVITE ADULT 10 mL, thiamine 100 mg, folic acid 1 mg infusion     vitamin B1 (THIAMINE) tablet 100 mg   No Known Allergies    ===========================  Objective  /52   Pulse 62   Temp 97.8  F (36.6  C) (Oral)   Resp 14   Wt 74.8 kg (165 lb)   SpO2 95%   BMI 22.38 kg/m    gen- young man, appears somnolent   neuro- not cooperative enough to test orientation but I woke him up when on the medicine floor and he said \"Where am I?\" He was Surprised but seemed to understand when I told him he was in " the hospital. No hyperreflexia. No muscle rigidity. Pupils are normal size bilaterally but minimally responsive to light. No nystagmus.   heent- moist mucus membranes, atraumatic   cv- rrr, no m/r/g  pulm- cta, non labored, on ra   abdmn- patient screams that abdomen hurts when asked but then it is entirely non tender to palpation with stethoscope with distraction, nd, bs are normal  extrm- warm, no edema    Lab reviewed. Recheck BMP / MG / Phos normal. Lactate normal. Liver panel earlier normal. Glucose 88. WBC normal. Tylenol and ASA level normal earlier. EtOH 0.055 breath test.     EKG reviewed. NSR. No interval abnormalities.     Imaging reviewed. None recent, none indicated.     ===========================    Assessment/Plan: 23yoM w/ Hx of MDD and SI's presenting with suicide attempt and polysubstance abuse with possible overdose.     #Syncope- story does not fit seizure. Too young for cardiac culprit that is not congential, no murmurs on my auscultatory exam to suggest long-standing valve disease. Clinical picture does not fit endocarditis though he is at risk due to cocaine injection. Non focal to suggest CNS lesion. Arrhythmia possible due to drug use, monitoring for this, but less likely due to normal lytes. Most likely due to substance abuse- see below.      #Polysubstance Abuse / Overdose - included EtOH, percocet, xanax, cocaine, marijuana. Discussed case with poison control, greatly appreciate recs. Currently symptoms fit most closely a sedative effect of EtOH/Xanax/Percocet. Some evidence of stimulant action as he is intermittently agitated but by voice only.  -EKG and labs are reassuring at this point.   -Will use Narcan PRN somnolence   -Will use Benzo's PRN agitation   -Telemetry, Pulse Ox, Capnography   -Neuro checks Q4 x4   -MSSA protocol   -1 to 1   -UTox  -Banana Bag then po thiamine and folate   -CT head given head trauma with syncope and intermittent somnolence     #Suicide Attempt- SW and  Psych consult, [facility] recs     #MDD- Psych consult, shelby recs     Diet- ADAT  DVT PPx- Padua 0. SCDs.   Code- Presumed Full due to suicide attempt.   Dispo- Pending above w/u, possibly back to  after    Logan Rodarte MD - [facility]

## 2019-01-03 NOTE — CONSULTS
Initial psychiatric consult completed.    Recs:  1. Continue with MSSA for potential alcohol and benzodiazapine withdrawal.  2. Patient should be admitted to inpatient psychiatry as he is at high risk for suicide. I have notified the intake department. If he requests to leave, primary team should strongly consider placing patient on a hold.  3. I will follow-up with patient tomorrow if he is still on medical floor.

## 2019-01-03 NOTE — PROGRESS NOTES
"Approx 2300, this RN heard staff yell for help and this RN observed patient fall forward, to the ground. This RN directed HUC to call a rapid response.  Patient does not respond to voice or touch.  Radial pulse is weak but steady with 60 BPM. Breathing is shallow, but felt.  Resp. 14. Skin color is pink.  Patient hard rub on the patients back, the patient spontaneously yelled and attempted to stand.  Patient was guided into a chair then transferred to a rney.  Moonlighter and Code Blue team arrived and continue patient assessment.      Patient is asked about ingestion.  Patient stated, he took 2.5 xanax bars, 1 5mg Percocet, 1 gram of cocaine, cannabis, dabs, \"alot of alcohol\" today. Patient denies heroine or meth. Patient reports he smokes and snorts drugs.  Denies IV drug use.      "

## 2019-01-03 NOTE — ED NOTES
ED to Behavioral Floor Handoff    SITUATION  Hermes Allen is a 23 year old male who speaks English and lives in a home with others The patient arrived in the ED by ambulance from home with a complaint of Suicidal (attempted overdose by taking 5 tabs of xanax and a few shots of alcohol, alcohol breath test 0.055)  .The patient's current symptoms started/worsened a few weeks ago and during this time the symptoms have increased.   In the ED, pt was diagnosed with   Final diagnoses:   Suicide attempt by drug ingestion, initial encounter (H)        Initial vitals were: BP: 116/63  Pulse: 101  Heart Rate: 108  Temp: 97.8  F (36.6  C)  Resp: 16  Weight: 74.8 kg (165 lb)  SpO2: 95 %   --------  Is the patient diabetic? No   If yes, last blood glucose? --     If yes, was this treated in the ED? --  --------  Is the patient inebriated (ETOH) Yes or Impaired on other substances? Yes  MSSA done? Yes  Last MSSA score: --    Were withdrawal symptoms treated? N/A  Does the patient have a seizure history? No. If yes, date of most recent seizure--  --------  Is the patient patient experiencing suicidal ideation? reports the following suicide factors: attempted overdose by ingesting xanax and alcohol, wanted to jump off a bridge    Homicidal ideation? denies current or recent homicidal ideation or behaviors.    Self-injurious behavior/urges? reports current or recent self injurious behavior or ideation including scratches to right anterior thigh from using a steak knife.  ------  Was pt aggressive in the ED No  Was a code called No  Is the pt now cooperative? Yes  -------  Meds given in ED:   Medications   hydrOXYzine (ATARAX) tablet 50 mg (50 mg Oral Given 1/2/19 2003)      Family present during ED course? Yes  Family currently present? Yes    BACKGROUND  Does the patient have a cognitive impairment or developmental disability? No  Allergies: No Known Allergies.   Social demographics are   Social History     Socioeconomic History      Marital status: Single     Spouse name: None     Number of children: None     Years of education: None     Highest education level: None   Social Needs     Financial resource strain: None     Food insecurity - worry: None     Food insecurity - inability: None     Transportation needs - medical: None     Transportation needs - non-medical: None   Occupational History     None   Tobacco Use     Smoking status: Current Every Day Smoker     Packs/day: 0.50     Smokeless tobacco: Former User     Tobacco comment: e. 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    Lacrosse and Hockey    Works in [employer]        ASSESSMENT  Labs results   Labs Ordered and Resulted from Time of ED Arrival Up to the Time of Departure from the ED   ALCOHOL BREATH TEST POCT - Abnormal; Notable for the following components:       Result Value    Alcohol Breath Test 0.055 (*)     All other components within normal limits   CBC WITH PLATELETS DIFFERENTIAL   COMPREHENSIVE METABOLIC PANEL   ACETAMINOPHEN LEVEL   SALICYLATE LEVEL   DRUG ABUSE SCREEN 6 CHEM DEP URINE ([facility])      Imaging Studies: No results found for this or any previous visit (from the past 24 hour(s)).   Most recent vital signs BP 99/56   Pulse 86   Temp 97.8  F (36.6  C) (Oral)   Resp 18   Wt 74.8 kg (165 lb)   SpO2 96%   BMI 22.38 kg/m     Abnormal labs/tests/findings requiring intervention:---   Pain control: good  Nausea control: pt had none    RECOMMENDATION  Are any infection precautions needed (MRSA, VRE, etc.)? No If yes, what infection? --  ---  Does the patient have mobility  issues? independently. If yes, what device does the pt use? ---  ---  Is patient on 72 hour hold or commitment? No If on 72 hour hold, have hold and rights been given to patient? N/A  Are admitting orders written if after 10 p.m. ?N/A  Tasks needing to be completed:---     Aniyah Fregoso    [phone] [place] [facility]   [phone] [facility]

## 2019-01-03 NOTE — PROGRESS NOTES
Patient gave permission to call his mom to ;let her know he is here, Called at 1710 and phone was not accepting phone calls.   Montserrat Meng

## 2019-01-03 NOTE — ED PROVIDER NOTES
"  History     Chief Complaint   Patient presents with     Suicidal     attempted overdose by taking 5 tabs of xanax and a few shots of alcohol, alcohol breath test 0.055     HPI  Hermes Allen is a 23 year old male with a history of depression, anxiety, ADHD, who presents to the emergency department for evaluation of suicidal ideation and ingestion.  Patient states he took \"5 white bars\" of Xanax at approximately 4:15 PM to 4:30 PM.  Patient states that at this time he was standing on a bridge ready to jump when his friend pulled him off and called the  for an ambulance to take him to the ED.  Denies other ingestion today. Patient also admits to regular, but not daily, alcohol, marijuana, cocaine, LSD, Xanax, and tobacco use.  Typically uses on the weekends. Patient states he has been depressed and wanting to kill himself for the past 3 years.  When asked how much he drank today he responded with \"not enough, though I wish I did\".  He denies fever, chest pain, shortness of breath, nausea, vomiting, diarrhea, abdominal pain.  He states that he is tired and wants nicotine.  Patient denies HI or hallucinations.    I have reviewed the Medications, Allergies, Past Medical and Surgical History, and Social History in the [facility] system.  Past Medical History:   Diagnosis Date     ADHD (attention deficit hyperactivity disorder)      Anxiety      Depressive disorder        Past Surgical History:   Procedure Laterality Date     No Surgical History         Family History   Problem Relation Age of Onset     Cancer Mother 46        Lung       Social History     Tobacco Use     Smoking status: Current Every Day Smoker     Packs/day: 0.50     Smokeless tobacco: Former User     Tobacco comment: e. cigarettes   Substance Use Topics     Alcohol use: Yes     Comment: weekly usage ranging from 4 beers to 1/2 bottle of vodka       No current facility-administered medications for this encounter.      Current Outpatient Medications "   Medication     sertraline (ZOLOFT) 50 MG tablet      No Known Allergies    Review of Systems   Constitutional: Positive for fatigue. Negative for fever.   Respiratory: Negative for shortness of breath.    Cardiovascular: Negative for chest pain.   Gastrointestinal: Negative for diarrhea, nausea and vomiting.   Psychiatric/Behavioral: Positive for suicidal ideas.   All other systems reviewed and are negative.      Physical Exam   BP: 116/63  Pulse: 101  Heart Rate: 108  Temp: 97.8  F (36.6  C)  Resp: 16  Weight: 74.8 kg (165 lb)  SpO2: 95 %      Physical Exam  GEN:  Well developed, no acute distress, appears angry, poor eye contact  HEENT:  EOMI, Mucous membranes are moist.   Cardio:  RRR, no murmur, radial pulses equal bilaterally  PULM:  Lungs clear, good air movement, no wheezes, rales  Abd:  Soft, normal bowel sounds, has mild left sided abdominal tenderness, no percussion tenderness  Musculoskeletal:  normal range of motion, no lower extremity swelling or calf tenderness  Neuro:  Alert and oriented X3, Follows commands, moving all extremities spontaneously   Skin:  Warm, dry    ED Course   6:45 PM  The patient was seen and examined by Antionette Hernandez MD in Room 9.        Procedures             EKG Interpretation:      Interpreted by Bailey Hernandez  Time reviewed: 18:47  Symptoms at time of EKG: overdose   Rhythm: normal sinus   Rate: normal  Axis: normal  Ectopy: none  Conduction: normal  ST Segments/ T Waves: No ST-T wave changes  Q Waves: none  Comparison to prior: Lower voltage on today's EKG to compared to April 10, 2018, otherwise no change.    Clinical Impression: normal EKG        Critical Care time:  none  Patient remained stable throughout his emergency department course, but did have tearfulness.  He did not have any episodes of decreased mental status or evidence of CNS depression.  He is not exhibiting signs of significant benzodiazepine overdose.  He is medically cleared for admission to  mental health.  Labs are normal except as shown.    Labs Ordered and Resulted from Time of ED Arrival Up to the Time of Departure from the ED   ALCOHOL BREATH TEST POCT - Abnormal; Notable for the following components:       Result Value    Alcohol Breath Test 0.055 (*)     All other components within normal limits   CBC WITH PLATELETS DIFFERENTIAL   COMPREHENSIVE METABOLIC PANEL   ACETAMINOPHEN LEVEL   SALICYLATE LEVEL   DRUG ABUSE SCREEN 6 CHEM DEP URINE ([facility])            Assessments & Plan (with Medical Decision Making)   Patient presents with intentional overdose in a suicide attempt.  He continues to make suicidal comments.  He is medically cleared at this time, but will need admission to mental health bed for stabilization.    I have reviewed the nursing notes.    I have reviewed the findings, diagnosis, plan and need for follow up with the patient.       Medication List      There are no discharge medications for this visit.         Final diagnoses:   Suicide attempt by drug ingestion, initial encounter (H)     I, Ramy Agosto, am serving as a trained medical scribe to document services personally performed by Antionette Hernandez MD, based on the provider's statements to me.   IAntionette MD, was physically present and have reviewed and verified the accuracy of this note documented by Ramy Agosto.    1/2/2019   [facility], [place], EMERGENCY DEPARTMENT     Bailey Hernandez MD  01/02/19 6912

## 2019-01-03 NOTE — PROGRESS NOTES
"Pt arrived on the unit at 2300 with security and an ED staff.  Pt appeared to be sleeping and would not open his eyes or acknowledge staff's presence.  Pt was told to stand up, pt got up and fell foward on the floor.  Pt passed out and would not respond to his name.  Pt was unresponsive for 2 minutes, drooling and cyanotic.  Pt's vitals at that time was 103/60 - 96.9 - 81 - 14.  Oxygen saturation was 96% at room air.  Rapid response and Code blue was called.  When the they arrived, pt was more alert/oriented and was  responding to questions .  The moonlighter did his assessment and pt was deemed ready to be transferred from the floor to the [facility].  Pt was assisted to the [facility] by 2 staff.  Pt cried out \"I am hurting all over, I need to go home and lay on my bed\".  Charge nurse asked pt what he had taken and what time.  Pt stated he took 2.5 bars of Xanax, Dabs, THC and 1Gm cocaine and Alcohol.   Pt reports he took all those drugs at 3pm.  Pt's vitals at 11:15 was 100/50 - 98.1 - 71 - 14.  Sats 96% R.A.  The ANS and moonlighter agreed pt needs to be transferred to [facility].(medical).  Pt was escorted to Medical 10A on a [facility] with the moonlighter, ANS and a staff.    Report was given to Medical staff at 23:45 when pt arrived on the unit.  "

## 2019-01-03 NOTE — CONSULTS
Consult Date:  01/03/2019      REASON FOR CONSULTATION:  A 23-year-old man admitted following a suicide attempt via drug overdose with plan to jump from bridge.      HISTORY OF PRESENT ILLNESS:  The patient is a 23-year-old man who is admitted with suicide attempt.  He drank, took 5 bars of Xanax.  He was standing at the bridge, ready to jump, when a friend pulled him off and called police.  He regularly uses alcohol, marijuana, cocaine, LSD, Xanax and tobacco.  He has been depressed, wanting to kill himself for the last 3 years.  He reports that he is angry that he did not kill himself successfully.  He says that he is too stupid to pull it off.  Said he has tried a number of times.  The patient is tearful at times throughout the interview.  He is mostly distressed about his financial social situation.  He indicates that he rents a house with some roommates.  He has worked as a  for 4 or 5 years.  He reports that his bank account is negative $200, because he keeps giving him money to his mom and his younger brother, who is 18.  He reports that neither of them work or at least do not bring in enough money to sustain.  He states that his mom buys fancy purses and other things and then asks him for money.  He says he feels manipulated.  He keeps getting sucked in and keeps giving them more money than he has.      He reports that he has been psychiatrically hospitalized a number of times.  Most recently, he was here in October under the care of Dr. Vila from 10/03/2018 to 10/05/2018.  He was under the care of Dr. Cuenca in May 2015.  He reports multiple suicide attempts.  He states he does not take medications.  He does not follow through with treatment, because he does not have enough money to get care.      PAST MEDICAL HISTORY:  The patient denies any chronic or acute medical issues.      SUBSTANCE HISTORY:  Relevant substance history is noted in HPI.      PHYSICAL REVIEW OF SYSTEMS:  The patient is  currently tired, depressed, feels unsteady on his feet.  Denies other problems on 10-point review of systems, except as noted in HPI.      FAMILY HISTORY:  Reports mental illness in multiple family members.  He is not specific, does not know what they take.      SOCIAL HISTORY:  Relevant social history as noted in HPI.      ALLERGIES:  NO KNOWN DRUG ALLERGIES.      PRIOR TO ADMISSION MEDICATIONS:  The patient denies taking any psychiatric medications.  He does not know what he was prescribed.      LABORATORY RESULTS:  Metabolic panel within normal limits.  TSH normal.  CK, lactic acid are normal.  Glucose was normal.  CBC within normal limits.  INR, PTT within normal limits.       VITAL SIGNS:  Temperature 95.8, pulse is 79, respiratory rate 16, blood pressure is 95/50, oxygen saturation is 96% on room air.      PHYSICAL EXAMINATION:  I have reviewed physical exam as documented by Internal Medicine physician, Logan Rodarte MD, dated 01/03/2019.  I have no additional findings at this time.      MENTAL STATUS EXAMINATION:  The patient is initially sleepy; however, he wakes quickly.  He is cooperative throughout the interview.  He is somewhat unkempt.  He is in a hospital bed.  He has poor eye contact.  Speech is normal in rate and volume.  Language is intact.  Mood is depressed.  Affect is somewhat labile, mostly depressed and tearful.  He has some psychomotor agitation when discussing distressing topics.  Strength and tone appear normal in upper extremities.  Gait and station were not tested, as he is reportedly unsteady and falling and needed 2 people to help him earlier in the day.  Thought process is linear, somewhat illogical.  Associations are intact.  Thought content is positive for suicidal thoughts.  No current signs of psychosis.  Recent and remote memory are intact.  Fund of knowledge is adequate.  Attention and concentration are intact.  Insight is limited.  Judgment is poor.      DIAGNOSES:   1.  Major  depressive disorder, recurrent, severe, without psychotic features.   2.  Suicide attempt via substance ingestion with plan to jump from bridge.      ASSESSMENT:  The patient is a 23-year-old man who appears quite hopeless, does not appear to be happy about living.  He has no plan for maintaining safety when he leaves the hospital.  When confronted with the fact that he likely will end up dead if he left without resources, he reports that he does not care.  Because of that, I feel that the patient needs to be psychiatrically hospitalized at this time.      PLAN:   1.  The patient should be hospitalized on the psych unit.  I have called Intake to place him on the wait list.  Internal Medicine should contact Intake Department when he is medically clear, able to walk without assistance.   2.  Medications.  Given his overdose, his general unsteadiness, we will hold off on prescribing anything right at this moment.  We will defer admission to Inpatient Psychiatry for management of psychiatric medications as it takes several weeks to kick it anyway.  The delay of 1 day is not going to substantially change his long-term care.   3.  Legal:  The patient is currently voluntary.  I have advised him that I recommend he come to the psych unit.  He is not happy about it, but not outright asked to leave.  Should he demand to leave, sign and intent to discharge, would recommend that he be evaluated by primary team and strongly consider placing patient on a hold.  If this happens, please contact Intake Department to update them with the patient's new legal status.   4.  At this time, Psychiatry Consult Service will follow up with him on a daily basis until he is admitted to Inpatient Psychiatry.         SANDRA ALEXANDER MD             D: 2019   T: 2019   MT: MELISSA      Name:     ALYSE HUTCHINS   MRN:      [id number]        Account:       [id number]   :      1995           Consult Date:  2019      Document:  [id number]

## 2019-01-03 NOTE — PLAN OF CARE
Pt arrived to unit at 23:25 via cart along with security officers and psych associate.  In and out of somnolence. Tele: Sinus dysrhythmia. Mood is labile. MSSA = 6. Refused VS and assessment (minimal assessment done).  Took encouragement and lots of coaxing to get him on tele monitoring. Continuous pulse oximetry initiated.  Refused to get an IV inserted. Paged flyer RN to attempt but pt refused despite coaching.  Paged the moonlighter to obtain orders for topical lidocaine. Approached pt an hour later and was able to get a PIV in left hand. Banana bag infusing at 150cc/hr.  Updated Poison control from [facility] about pt's condition.  Seizure pads on siderails for safety. 1:1 attendant present at bedside.

## 2019-01-03 NOTE — UTILIZATION REVIEW
"[facility]    Admission Status; Secondary Review Determination     Admission Date: 1/2/2019  5:59 PM      Under the authority of the Utilization Management Committee, the utilization review process indicated a secondary review on the above patient.  The review outcome is based on review of the medical records, discussions with staff, and applying clinical experience noted on the date of the review.          (x) Observation Status Appropriate - This patient does not meet hospital inpatient criteria and is placed in observation status. If this patient's primary payer is Medicare and was admitted as an inpatient, Condition Code 44 should be used and patient status changed to \"observation\".     RATIONALE FOR DETERMINATION   23-year-old male with a history of depression and suicidal ideation presented with suicide attempt and polysubstance abuse with overdose.  He also experienced a syncopal event which is thought related to his polysubstance ingestion.  He is currently awake and agitated but otherwise only being observed for excessive sedation or other complications relating to his overdose.  This patient is otherwise stable and appears to be overall improving with need for further help mental health stabilization.    The severity of illness, intensity of service provided, expected LOS and risk for adverse outcome make the care appropriate for further observation; however, doesn't meet criteria for hospital inpatient admission. Dr Haynes was paged with this recommendation.        The information on this document is developed by the utilization review team in order for the business office to ensure compliance.  This only denotes the appropriateness of proper admission status and does not reflect the quality of care rendered.         The definitions of Inpatient Status and Observation Status used in making the determination above are those provided in the CMS Coverage Manual, Chapter 1 and Chapter 6, section 70.4.    "   Sincerely,     Rayray Coreas DO MPH   Physician Advisor  Utilization Review  [facility]

## 2019-01-03 NOTE — PLAN OF CARE
Pt had friends in room. He became  more upset. Friends were asked to leaves Cont with 1to1  !400  Pt had taken off the tele monitor and the capo. He attempted to get up to bathroom but was unsteady and fell back into bed. He is on cont obsevation..

## 2019-01-04 ENCOUNTER — HOSPITAL ENCOUNTER (INPATIENT)
Facility: CLINIC | Age: 24
LOS: 4 days | Discharge: HOME | DRG: 885 | End: 2019-01-08
Attending: PSYCHIATRY & NEUROLOGY | Admitting: PSYCHIATRY & NEUROLOGY

## 2019-01-04 VITALS
TEMPERATURE: 98.5 F | DIASTOLIC BLOOD PRESSURE: 79 MMHG | BODY MASS INDEX: 22.7 KG/M2 | RESPIRATION RATE: 14 BRPM | SYSTOLIC BLOOD PRESSURE: 146 MMHG | OXYGEN SATURATION: 98 % | WEIGHT: 167.4 LBS | HEART RATE: 44 BPM

## 2019-01-04 DIAGNOSIS — R45.851 SUICIDAL IDEATION: Primary | ICD-10-CM

## 2019-01-04 PROCEDURE — 12400001 ZZH R&B MH UMMC

## 2019-01-04 PROCEDURE — 93005 ELECTROCARDIOGRAM TRACING: CPT

## 2019-01-04 PROCEDURE — G0378 HOSPITAL OBSERVATION PER HR: HCPCS

## 2019-01-04 PROCEDURE — 25000132 ZZH RX MED GY IP 250 OP 250 PS 637: Performed by: PSYCHIATRY & NEUROLOGY

## 2019-01-04 PROCEDURE — 25000132 ZZH RX MED GY IP 250 OP 250 PS 637: Performed by: STUDENT IN AN ORGANIZED HEALTH CARE EDUCATION/TRAINING PROGRAM

## 2019-01-04 PROCEDURE — 93010 ELECTROCARDIOGRAM REPORT: CPT | Performed by: INTERNAL MEDICINE

## 2019-01-04 PROCEDURE — 25000128 H RX IP 250 OP 636: Performed by: INTERNAL MEDICINE

## 2019-01-04 PROCEDURE — 99217 ZZC OBSERVATION CARE DISCHARGE: CPT | Performed by: INTERNAL MEDICINE

## 2019-01-04 PROCEDURE — 99233 SBSQ HOSP IP/OBS HIGH 50: CPT | Performed by: PSYCHIATRY & NEUROLOGY

## 2019-01-04 PROCEDURE — 96361 HYDRATE IV INFUSION ADD-ON: CPT

## 2019-01-04 RX ORDER — OLANZAPINE 10 MG/1
10 TABLET ORAL
Status: DISCONTINUED | OUTPATIENT
Start: 2019-01-04 | End: 2019-01-08 | Stop reason: HOSPADM

## 2019-01-04 RX ORDER — LANOLIN ALCOHOL/MO/W.PET/CERES
100 CREAM (GRAM) TOPICAL DAILY
Qty: 30 TABLET | Refills: 0 | Status: ON HOLD | DISCHARGE
Start: 2019-01-04 | End: 2019-01-08

## 2019-01-04 RX ORDER — OLANZAPINE 10 MG/2ML
10 INJECTION, POWDER, FOR SOLUTION INTRAMUSCULAR
Status: DISCONTINUED | OUTPATIENT
Start: 2019-01-04 | End: 2019-01-08 | Stop reason: HOSPADM

## 2019-01-04 RX ORDER — MULTIPLE VITAMINS W/ MINERALS TAB 9MG-400MCG
1 TAB ORAL DAILY
Qty: 30 TABLET | Refills: 0 | Status: ON HOLD | DISCHARGE
Start: 2019-01-04 | End: 2019-01-08

## 2019-01-04 RX ORDER — ACETAMINOPHEN 325 MG/1
650 TABLET ORAL EVERY 4 HOURS PRN
Status: DISCONTINUED | OUTPATIENT
Start: 2019-01-04 | End: 2019-01-08 | Stop reason: HOSPADM

## 2019-01-04 RX ORDER — HYDROXYZINE HYDROCHLORIDE 25 MG/1
25 TABLET, FILM COATED ORAL EVERY 4 HOURS PRN
Status: DISCONTINUED | OUTPATIENT
Start: 2019-01-04 | End: 2019-01-08 | Stop reason: HOSPADM

## 2019-01-04 RX ORDER — POLYETHYLENE GLYCOL 3350 17 G
2-4 POWDER IN PACKET (EA) ORAL
Status: DISCONTINUED | OUTPATIENT
Start: 2019-01-04 | End: 2019-01-08 | Stop reason: HOSPADM

## 2019-01-04 RX ORDER — FOLIC ACID 1 MG/1
1 TABLET ORAL DAILY
Qty: 30 TABLET | Refills: 0 | Status: ON HOLD | DISCHARGE
Start: 2019-01-04 | End: 2019-01-08

## 2019-01-04 RX ORDER — TRAZODONE HYDROCHLORIDE 50 MG/1
50 TABLET, FILM COATED ORAL
Status: DISCONTINUED | OUTPATIENT
Start: 2019-01-04 | End: 2019-01-08 | Stop reason: HOSPADM

## 2019-01-04 RX ADMIN — SERTRALINE HYDROCHLORIDE 50 MG: 50 TABLET ORAL at 16:51

## 2019-01-04 RX ADMIN — NICOTINE POLACRILEX 4 MG: 4 GUM, CHEWING ORAL at 17:40

## 2019-01-04 RX ADMIN — SODIUM CHLORIDE, POTASSIUM CHLORIDE, SODIUM LACTATE AND CALCIUM CHLORIDE: 600; 310; 30; 20 INJECTION, SOLUTION INTRAVENOUS at 05:55

## 2019-01-04 ASSESSMENT — ACTIVITIES OF DAILY LIVING (ADL)
TOILETING: 0-->INDEPENDENT
AMBULATION: 0-->INDEPENDENT
TRANSFERRING: 0-->INDEPENDENT
COGNITION: 0 - NO COGNITION ISSUES REPORTED
RETIRED_COMMUNICATION: 0-->UNDERSTANDS/COMMUNICATES WITHOUT DIFFICULTY
DRESS: 0-->INDEPENDENT
BATHING: 0-->INDEPENDENT
FALL_HISTORY_WITHIN_LAST_SIX_MONTHS: NO
RETIRED_EATING: 0-->INDEPENDENT
SWALLOWING: 0-->SWALLOWS FOODS/LIQUIDS WITHOUT DIFFICULTY

## 2019-01-04 ASSESSMENT — MIFFLIN-ST. JEOR: SCORE: 1768.76

## 2019-01-04 NOTE — PROGRESS NOTES
"Pt is voluntary for psych admission. Writer spoke with pt and he stated \"whatever I have to do to get better\".   "

## 2019-01-04 NOTE — PLAN OF CARE
At 19:30 a friend of pt called to say that pt had texted friends that he had intensionally tried to commit suicide. Pt said that when he discharges he is going to try to kill himself again. Friends were very concerned. Staff will pass this on to the Doctors.

## 2019-01-04 NOTE — PROGRESS NOTES
01/04/19 1444   Patient Belongings   Patient Belongings remains with patient;locker   Patient Belongings Put in Hospital Secure Location (Security or Locker, etc.) cell phone/electronics;clothing   Belongings Search Yes   Clothing Search Yes   Second Staff Bill RN   Pt belongings in locker: iphone and , grey sweatpants with drawstring, maroon sweatshorts with drawstring, black hoodie with drawstring  With pt: black shirt, socks, underwear  Pt did not have wallet, credit cards, cash, keys or shoes.  A               Admission:  I am responsible for any personal items that are not sent to the safe or pharmacy.  [facility] is not responsible for loss, theft or damage of any property in my possession.    Signature:  _________________________________ Date: _______  Time: _____                                              Staff Signature:  ____________________________ Date: ________  Time: _____      2nd Staff person, if patient is unable/unwilling to sign:    Signature: ________________________________ Date: ________  Time: _____     Discharge:  [facility] has returned all of my personal belongings:    Signature: _________________________________ Date: ________  Time: _____                                          Staff Signature:  ____________________________ Date: ________  Time: _____

## 2019-01-04 NOTE — PLAN OF CARE
Pt to discharge from med surg and be admitted to mental health. PIV removed. Pt met observation goals.

## 2019-01-04 NOTE — PLAN OF CARE
VS:   /64 (BP Location: Right arm)   Pulse 79   Temp 97  F (36.1  C) (Oral)   Resp 16   Wt 75.9 kg (167 lb 6.4 oz)   SpO2 100%   BMI 22.70 kg/m       Output:   Voids spontaneously. LBM 1/2//19   Lungs Clear 100% on room air    Activity:   SBA.   Skin: Intact except Tattoos    Pain:   Denies pain this shift    Neuro/CMS:   A&O x 4, CMS intact and denies N/T   Dressing(s):   None    Diet:   Regular - tolerated well. Ate 2 full meals    LDA:   PIV L arm Infusing LR   Equipment:   IV pole and Capno/   Plan:   Possible discharge to Psych when bed available,    Additional Info:

## 2019-01-04 NOTE — DISCHARGE SUMMARY
Discharge Summary    Hermes Allen MRN# [id number]   YOB: 1995 Age: 23 year old     Date of Admission:  1/2/2019  Date of Discharge:  1/4/2019  2:13 PM  Admitting Physician:  Markos Haynes MD  Discharge Physician:  Markos Haynes MD   Discharging Service:  Internal Medicine     Primary Provider: [facility]           Discharge Diagnosis:     Falls  Polysubstance ingestion  Sinus bradycardia         Procedures:   No procedures performed during this admission             Consultations:   Consultation during this admission received from Psychiatry. sw               Brief History of Illness:   For details please refer to H and P dated 1/2/2019  Briefly,  23yoM w/ Hx of MDD and SI's presenting with suicide attempt and polysubstance abuse with possible overdose. Unsteadiness, fall. ? syncopal episode. Unlikely seizure.           Hospital Course:   Issues:     # Fall, ? Syncopal episode: likely related to polysubstance ingestion. ? Overdose.   CT head neg.   Neuro: non focal.   - hemodynamics remained stable mostly.   - noted Sinus bradycardia (EKG done, reviewed) crystal during sleep, asymptomatic. Tele: no other event.   - no e/o sepsis.   - EKG, trop neg.   - hydration: ivf given. bekah orals at time of discharge.   - sitter 1:1  - fall precautions.   - ambulating well with no problem on day of discharge.     # Suicidal ideation/? attempt.   # MDD  # Polysubstance abuse: included EtOH, percocet, xanax, cocaine, marijuana        - Psychiatry. Sw consulted  - 1:1  - suicide precautions  - MSSA protocol with benzo. No e/o withdrawal 1/4/2019  - inpatient psychiatry admission       Registered to Observation status per UR.              Discharge Day Exam:    Interval/Subjective:   Doing better  Alert interactive. Co operative  bekah orals  Ambulatory  No other concern.     Blood pressure 111/47, pulse (!) 44, temperature 97.2  F (36.2  C), temperature source Oral, resp. rate 12, weight 75.9 kg  (167 lb 6.4 oz), SpO2 99 %.    Wt Readings from Last 5 Encounters:   19 75.9 kg (167 lb 6.4 oz)   10/03/16 73.5 kg (162 lb) (65 %)*   16 70.3 kg (155 lb 0 oz) (58 %)*   05/20/15 64 kg (141 lb) (43 %)*   04/27/15 63.5 kg (140 lb) (42 %)*     * Growth percentiles are based on [facility] (Boys, 2-20 Years) data.       Temp (24hrs), Av.5  F (35.8  C), Min:95.1  F (35.1  C), Max:97.2  F (36.2  C)    General: Alert and interactive, NAD  HEENT: AT/NC, PERRLA, Moist MM  Neck: Supple, no JVD or Lymphadenopathy  Chest: Clear BL, non labored.   CVS: S1S2 regular, no m/r/g, no pedal edema  Abd: Soft, non tender, non distended, BS +  MSK: Distal pulses 2+.     Neuro: AO x 4,   Skin: no new rash on exposed areas.               Significant Results Obtained During Hospitalization:   All relevant data  Reviewed.      Lab Results   Component Value Date    WBC 5.6 2019    HGB 16.8 2019    HCT 48.7 2019     2019     2019    POTASSIUM 3.9 2019    CHLORIDE 105 2019    CO2 28 2019    BUN 16 2019    CR 0.88 2019    GLC 80 2019    TROPI <0.015 2019    AST 17 2019    ALT 16 2019    ALKPHOS 80 2019    BILITOTAL 0.5 2019    INR 0.96 2019      Recent Results (from the past 48 hour(s))   CT Head w/o Contrast    Narrative    CT HEAD W/O CONTRAST 1/3/2019 12:56 AM    Provided History: Syncope, recurrent    Comparison: None.    Technique: Using multidetector thin collimation helical acquisition  technique, axial, coronal and sagittal CT images from the skull base  to the vertex were obtained without intravenous contrast.     Findings:    No intracranial hemorrhage, mass effect, or midline shift. The  ventricles are proportionate to the cerebral sulci. The gray to white  matter differentiation of the cerebral hemispheres is preserved. The  basal cisterns are patent.    Mild mucosal thickening and stranding in the maxillary  sinuses, with  mild ethmoid mucosal thickening. The remainder of the paranasal  sinuses are clear. The mastoid air cells are clear.       Impression    Impression: No acute intracranial pathology.    I have personally reviewed the examination and initial interpretation  and I agree with the findings.    SHAINA AMATO MD                     Pending Results:     Unresulted Labs Ordered in the Past 30 Days of this Admission     No orders found from 11/3/2018 to 1/3/2019.               Condition on Discharge:   Discharge condition: Stable   Discharge vitals: Stable.    Code status on discharge: Full Code            Discharge Medications:     Current Discharge Medication List      START taking these medications    Details   folic acid (FOLVITE) 1 MG tablet Take 1 tablet (1 mg) by mouth daily  Qty: 30 tablet, Refills: 0    Associated Diagnoses: Substance abuse (H)      multivitamin w/minerals (THERA-VIT-M) tablet Take 1 tablet by mouth daily  Qty: 30 tablet, Refills: 0    Associated Diagnoses: Substance abuse (H)      vitamin B1 (THIAMINE) 100 MG tablet Take 1 tablet (100 mg) by mouth daily  Qty: 30 tablet, Refills: 0    Associated Diagnoses: Substance abuse (H)         STOP taking these medications       sertraline (ZOLOFT) 50 MG tablet Comments:   Reason for Stopping:                      Discharge Disposition:   Transferred to inpatient psychiatry             Discharge Instructions:     Discharge Procedure Orders   Reason for your hospital stay   Order Comments: Hospital stay for suspect intoxication (multiple substance use), fainting, unsteadiness, acute encephalopathy. Monitored on Tele. Frequent vitals. Iv hydration. Noted sinus bradycardia with no symptom. Hemodynamics remained stable.   At the time of discharge. Tolerating oral diet. Ambulatory.     Activity - Up ad fadia     Order Specific Question Answer Comments   Is discharge order? Yes      Advance Diet as Tolerated   Order Comments: Follow this diet upon  discharge: Orders Placed This Encounter      Regular Diet Adult     Order Specific Question Answer Comments   Is discharge order? Yes           Thank you for the opportunity to participate in the care of your patient.  Please do not hesitate to contact our service with questions or concerns.    Total time spent was greater than 30 minutes; Greater than 50% of the time was in counseling and care coordination. Care coordination included discussion of medication changes, lifestyle changes and reviewing instructions to the patient .     D/W RN, SW  Called psychiatry provider. Hand off given.       Markos Haynes MD   Hospitalist (Internal Medicine)  Pager: [phone].     DOS : 1/4/2019

## 2019-01-04 NOTE — CONSULTS
[facility], [place]   Psychiatric Consult Follow-Up  Hospital Day: 1        Interim History:   The patient's chart was reviewed. I spoke with nursing and 1:1 staff. Patient continues to report suicidal thinking. He is physically stable and has been able to walk on his own right now. A friend contacted the unit indicating that the patient reported intention to kill self after he leaves.    Upon interview, the patient reports worries about money and his job, but understands my concerns about his safety. He also asks about getting health insurance. Although his concerns are the same today, he is much less emotionally dysregulated and able to have a more reasonable conversation.    Psychiatric Symptoms: ongoing depression and suicidal thinking    Medication side effects reported: none    Other issues reported by patient: none         Medications:       folic acid  1 mg Oral Daily     vitamin B1  100 mg Oral Daily          Allergies:   No Known Allergies       Labs:     Recent Results (from the past 48 hour(s))   Alcohol breath test POCT    Collection Time: 01/02/19  6:20 PM   Result Value Ref Range    Alcohol Breath Test 0.055 (A) 0.00 - 0.01   EKG 12-lead, tracing only    Collection Time: 01/02/19  6:46 PM   Result Value Ref Range    Interpretation ECG Click View Image link to view waveform and result    CBC with platelets differential    Collection Time: 01/02/19  7:01 PM   Result Value Ref Range    WBC 7.6 4.0 - 11.0 10e9/L    RBC Count 5.30 4.4 - 5.9 10e12/L    Hemoglobin 17.3 13.3 - 17.7 g/dL    Hematocrit 49.1 40.0 - 53.0 %    MCV 93 78 - 100 fl    MCH 32.6 26.5 - 33.0 pg    MCHC 35.2 31.5 - 36.5 g/dL    RDW 12.6 10.0 - 15.0 %    Platelet Count 249 150 - 450 10e9/L    Diff Method Automated Method     % Neutrophils 66.7 %    % Lymphocytes 26.9 %    % Monocytes 5.4 %    % Eosinophils 0.7 %    % Basophils 0.3 %    % Immature Granulocytes 0.0 %    Nucleated RBCs 0 0 /100    Absolute  Neutrophil 5.1 1.6 - 8.3 10e9/L    Absolute Lymphocytes 2.0 0.8 - 5.3 10e9/L    Absolute Monocytes 0.4 0.0 - 1.3 10e9/L    Absolute Eosinophils 0.1 0.0 - 0.7 10e9/L    Absolute Basophils 0.0 0.0 - 0.2 10e9/L    Abs Immature Granulocytes 0.0 0 - 0.4 10e9/L    Absolute Nucleated RBC 0.0    Comprehensive metabolic panel    Collection Time: 01/02/19  7:01 PM   Result Value Ref Range    Sodium 141 133 - 144 mmol/L    Potassium 3.8 3.4 - 5.3 mmol/L    Chloride 103 94 - 109 mmol/L    Carbon Dioxide 28 20 - 32 mmol/L    Anion Gap 10 3 - 14 mmol/L    Glucose 88 70 - 99 mg/dL    Urea Nitrogen 16 7 - 30 mg/dL    Creatinine 1.01 0.66 - 1.25 mg/dL    GFR Estimate >90 >60 mL/min/[1.73_m2]    GFR Estimate If Black >90 >60 mL/min/[1.73_m2]    Calcium 9.1 8.5 - 10.1 mg/dL    Bilirubin Total 0.5 0.2 - 1.3 mg/dL    Albumin 4.2 3.4 - 5.0 g/dL    Protein Total 7.7 6.8 - 8.8 g/dL    Alkaline Phosphatase 80 40 - 150 U/L    ALT 16 0 - 70 U/L    AST 17 0 - 45 U/L   Acetaminophen level    Collection Time: 01/02/19  7:01 PM   Result Value Ref Range    Acetaminophen Level <2 mg/L   Salicylate level    Collection Time: 01/02/19  7:01 PM   Result Value Ref Range    Salicylate Level <2 mg/dL   Glucose by meter    Collection Time: 01/02/19 11:00 PM   Result Value Ref Range    Glucose 88 70 - 99 mg/dL   Blood gas venous and oxyhgb    Collection Time: 01/02/19 11:16 PM   Result Value Ref Range    Ph Venous 7.43 7.32 - 7.43 pH    PCO2 Venous 43 40 - 50 mm Hg    PO2 Venous 77 (H) 25 - 47 mm Hg    Bicarbonate Venous 29 (H) 21 - 28 mmol/L    FIO2 ROOM AIR     Oxyhemoglobin Venous 95 %    Base Excess Venous 3.7 mmol/L   Basic metabolic panel    Collection Time: 01/02/19 11:16 PM   Result Value Ref Range    Sodium 141 133 - 144 mmol/L    Potassium 3.9 3.4 - 5.3 mmol/L    Chloride 105 94 - 109 mmol/L    Carbon Dioxide 28 20 - 32 mmol/L    Anion Gap 8 3 - 14 mmol/L    Glucose 80 70 - 99 mg/dL    Urea Nitrogen 16 7 - 30 mg/dL    Creatinine 0.88 0.66 - 1.25  mg/dL    GFR Estimate >90 >60 mL/min/[1.73_m2]    GFR Estimate If Black >90 >60 mL/min/[1.73_m2]    Calcium 9.0 8.5 - 10.1 mg/dL   CBC with platelets differential    Collection Time: 01/02/19 11:16 PM   Result Value Ref Range    WBC 7.6 4.0 - 11.0 10e9/L    RBC Count 5.18 4.4 - 5.9 10e12/L    Hemoglobin 17.1 13.3 - 17.7 g/dL    Hematocrit 47.7 40.0 - 53.0 %    MCV 92 78 - 100 fl    MCH 33.0 26.5 - 33.0 pg    MCHC 35.8 31.5 - 36.5 g/dL    RDW 12.5 10.0 - 15.0 %    Platelet Count 223 150 - 450 10e9/L    Diff Method Automated Method     % Neutrophils 50.4 %    % Lymphocytes 41.2 %    % Monocytes 6.8 %    % Eosinophils 1.2 %    % Basophils 0.3 %    % Immature Granulocytes 0.1 %    Nucleated RBCs 0 0 /100    Absolute Neutrophil 3.9 1.6 - 8.3 10e9/L    Absolute Lymphocytes 3.1 0.8 - 5.3 10e9/L    Absolute Monocytes 0.5 0.0 - 1.3 10e9/L    Absolute Eosinophils 0.1 0.0 - 0.7 10e9/L    Absolute Basophils 0.0 0.0 - 0.2 10e9/L    Abs Immature Granulocytes 0.0 0 - 0.4 10e9/L    Absolute Nucleated RBC 0.0    INR    Collection Time: 01/02/19 11:16 PM   Result Value Ref Range    INR 0.96 0.86 - 1.14   Lactic acid whole blood    Collection Time: 01/02/19 11:16 PM   Result Value Ref Range    Lactic Acid 1.1 0.7 - 2.0 mmol/L   Magnesium    Collection Time: 01/02/19 11:16 PM   Result Value Ref Range    Magnesium 1.9 1.6 - 2.3 mg/dL   Phosphorus    Collection Time: 01/02/19 11:16 PM   Result Value Ref Range    Phosphorus 4.7 (H) 2.5 - 4.5 mg/dL   Partial thromboplastin time    Collection Time: 01/02/19 11:16 PM   Result Value Ref Range    PTT 27 22 - 37 sec   Drug abuse screen 6 urine (chem dep) ([facility])    Collection Time: 01/03/19 10:45 AM   Result Value Ref Range    Amphetamine Qual Urine Negative NEG^Negative    Barbiturates Qual Urine Negative NEG^Negative    Benzodiazepine Qual Urine Negative NEG^Negative    Cannabinoids Qual Urine Positive (A) NEG^Negative    Cocaine Qual Urine Positive (A) NEG^Negative    Ethanol Qual Urine  Negative NEG^Negative    Opiates Qualitative Urine Negative NEG^Negative   Troponin I    Collection Time: 01/03/19 10:47 AM   Result Value Ref Range    Troponin I ES <0.015 0.000 - 0.045 ug/L   CBC with platelets    Collection Time: 01/03/19 10:47 AM   Result Value Ref Range    WBC 5.6 4.0 - 11.0 10e9/L    RBC Count 5.12 4.4 - 5.9 10e12/L    Hemoglobin 16.8 13.3 - 17.7 g/dL    Hematocrit 48.7 40.0 - 53.0 %    MCV 95 78 - 100 fl    MCH 32.8 26.5 - 33.0 pg    MCHC 34.5 31.5 - 36.5 g/dL    RDW 13.0 10.0 - 15.0 %    Platelet Count 201 150 - 450 10e9/L   CK total    Collection Time: 01/03/19 10:47 AM   Result Value Ref Range    CK Total 96 30 - 300 U/L   TSH    Collection Time: 01/03/19 10:47 AM   Result Value Ref Range    TSH 1.81 0.40 - 4.00 mU/L          Psychiatric Examination:     /47 (BP Location: Right arm)   Pulse (!) 44   Temp 97.2  F (36.2  C) (Oral)   Resp 12   Wt 75.9 kg (167 lb 6.4 oz)   SpO2 99%   BMI 22.70 kg/m    Weight is 167 lbs 6.4 oz  Body mass index is 22.7 kg/m .    Orthostatic Vitals       Most Recent      Sitting Orthostatic /58 01/04 0843    Sitting Orthostatic Pulse (bpm) 55 01/04 0843    Standing Orthostatic /64 01/04 0846    Standing Orthostatic Pulse (bpm) 62 01/04 0846            Appearance: awake, alert, adequately groomed and dressed in hospital scrubs  Attitude:  cooperative  Eye Contact:  good  Mood:  depressed  Affect:  mood congruent  Speech:  clear, coherent and normal prosody  Language: fluent and intact in English  Psychomotor, Gait, Musculoskeletal:  no evidence of tardive dyskinesia, dystonia, or tics and intact station, gait and muscle tone  Throught Process:  linear and goal oriented  Associations:  no loose associations  Thought Content:  active suicidal ideation present  Insight:  fair  Judgement:  fair  Oriented to:  time, person, and place  Attention Span and Concentration:  intact  Recent and Remote Memory:  intact  Fund of Knowledge:   appropriate                Diagnoses:      Suicide attempt by drug ingestion, initial encounter  Major depressive disorder, recurrent, severe without psychosis           Assessment & Recs:   Assessment and hospital summary:  23 year old man admitted following suicide attempt. While here he remains depressed and has continued to endorse suicidal thinking including sending texts to friends with plan to kill self after discharge. He is reluctantly agreeing to inpatient psychiatric stay now. Should he request to leave formally, would strongly consider a 72-hour hold. Would need to be assessed by primary team were that to be needed.    Recs:  1. Awaiting bed on inpatient psychiatry. Currently number 5 on wait list.  2. Will hold off on medications for today. Defer to inpatient psychiatric admission.

## 2019-01-04 NOTE — H&P
"History and Physical    Hermes Allen MRN# [id number]   Age: 23 year old YOB: 1995     Date of Admission:  1/2/2019          Contacts:   Primary Outpatient Psychiatrist: None   Primary Physician:  Clinic, [facility]  Therapist: None  Family Members: William (Friend), present during H&P         Chief Complaint:   \"I tried to kill myself\"         History of Present Illness:   History obtained from patient interview, chart review.  Pt interviewed on 01/04/19 at approximately 12:30pm.    This patient is a 23 year old male with who presented to [facility] ED on 01/02/19 for suicidal ideation and attempt via ingestion of \"5 bars of Xanax\".  At the time he took the Xanax he was standing on a bridge ready to jump when his friend pulled him off and called the  for an ambulance to take him to the ED.  In the ED, he denied other ingestion but admitted to regular use of alcohol, marijuana, cocaine, LSD, Xanax, and tobacco.  He states he typically uses these drugs on the weekends.  Patient stated he has been depressed and wanting to kill himself for the past three years.   He was cleared for admission to a psychiatry floor from the ED.  However, once he arrived on the psychiatry floor he fainted and would not respond to his name.  He was unresponsive for two minutes. A code Blue was called and patient was transferred to medicine unit 10A.     This writer spoke with Medicine provider (Dr. Haynes) who stated patient was somnolent and unsteady on his feet for the first 24 hours he was in on the medical unit.  However, his MSSA scores were 1 and on 01/04 he was able to ambulate independently and was medically cleared.  Dr. Vanegas saw the patient for a psychiatry consult on 01/03/19 and noted inpatient admission for suicidal ideation was the most appropriate course of action.     During my interview with the patient on 01/04/19 he endorsed that the above history is correct.  He stated that he was feeling pretty " "good until Wednesday night when \"everything got shitty\".  He states he has periods of time when he feels okay but then his depression will become too much to bear and he becomes suicidal. He endorses a significant history of suicide attempts dating back for the past 4-5 years including overdose attempts, alcohol poisoning, and carbon monoxide poisoning.  He states he felt good on Zoloft when he was taking it but stopped taking it because he felt like he didn't need it anymore, he has not taken it in over a year.  He states he is interested in starting it again during this admission.      He reports he has significant stress within his nuclear family.  He is the only one of his siblings who speaks to his mother and he feels stressed sweetie the \"middle man\".  He does not know his biological father.  He has a step-father who was addicted to painkillers and was kicked out of the house because the patient was stealing his Oxycodone.  He is very distressed that his family has not reached out to him at all since he has been in the hospital.      The risks, benefits, alternatives and side effects have been discussed and are understood by the patient and other caregivers.       Psychiatric Review of Systems:     Depression:   Reports: depressed mood, suicidal ideation, decreased interest,  guilt, hopelessness, helplessness, impaired concentration, decreased energy, irritability.   Denies: changes in sleep, changes in appetite  [place]:    Denies: sleeplessness, impulsiveness (spending, driving recklessly, change in sexual activity), racing thoughts, increased goal-directed activities, pressured speech, grandiose delusions.  Psychosis:   Denies: visual hallucinations, auditory hallucinations,   Anxiety:   Reports: worries, ruminating thoughts  PTSD:   Denies: re-experiencing past trauma, nightmares, increased arousal, avoidance of traumatic stimuli, impaired function.  OCD:   Denies: obsessions, checking, symmetry, cleaning, skin " picking.           Medical Review of Systems:   The Review of Systems is negative other than noted in the HPI         Psychiatric History:     Prior diagnoses: ADHD, MDD, GRACIA     Hospitalizations: Two prior, 2016 and 2015 at [facility] for depression and SI    Suicide attempts: multiple prior attempts, via alcohol poisoning, CO poisoning, overdose attempts    Self-injurious behavior: History of cutting, most recently on Wednesday    Violence: Per chart review there is some history of violent outbursts     ECT/TMS: none    Past medications: Adderall, Lexapro, Zoloft (current)         Substance Use History:     Nicotine: Uses E-cigarrettes    Alcohol: Drinks occasionally on weekends, states alcohol use prompts heavy drug use    Cannabis: Uses daily     Others: Cocaine use monthly at parties, has used LSD, mushrooms in the past    Prior CD treatments: None.  Has attended NA meetings occasionally with a friend         Past Medical History:     Past Medical History:   Diagnosis Date     ADHD (attention deficit hyperactivity disorder)      Anxiety      Depressive disorder      Past Surgical History:   Procedure Laterality Date     No Surgical History       No History of seizures or head trauma.       Allergies:    No Known Allergies       Medications:   - No PTA medications         Social History:     Upbringing: Grew up with mother, stepfather, and two brothers in [place] with brief stint living in [place].  Bounced around between many different school districts, including [employer], and [employer].     Family: Mother, stepfather, and two half-brothers.  He doesn't have contact with biological father.     Relationships: Not in a relationship currently, back and forth with recent potential love interest prompted suicidal ideation.     Living Situation: Currently living in a house with five male roomates     Education: Graduated from      Occupation: Is currently working for a [employer] service at [employer]     Legal: Nothing besides traffic tickets.     Abuse/Trauma: Denies             Family History:   No history of suicides.  No history of schizophrenia or bipolar disorder.  No history of chemical dependency.         Family History   Problem Relation Age of Onset     Cancer Mother 46        Lung            Labs:     Recent Results (from the past 24 hour(s))   EKG 12-lead, complete    Collection Time: 01/04/19 10:49 AM   Result Value Ref Range    Interpretation ECG Click View Image link to view waveform and result             Psychiatric Examination:     /47 (BP Location: Right arm)   Pulse (!) 44   Temp 97.2  F (36.2  C) (Oral)   Resp 12   Wt 75.9 kg (167 lb 6.4 oz)   SpO2 99%   BMI 22.70 kg/m      Appearance:  awake, alert, adequately groomed, dressed in hospital scrubs and appeared as age stated  Attitude:  cooperative  Eye Contact:  fair  Mood:  \"okay, still a little fuzzy\"  Affect:  appropriate and in normal range and mood congruent  Speech:  clear, coherent  Psychomotor Behavior:  no evidence of tardive dyskinesia, dystonia, or tics  Thought Process:  logical, linear and goal oriented  Associations:  no loose associations  Thought Content:  no evidence of psychotic thought, passive suicidal ideation present, no auditory hallucinations present and no visual hallucinations present  Insight:  fair  Judgment:  fair  Oriented to:  time, person, and place  Attention Span and Concentration:  fair  Recent and Remote Memory:  fair  Language:  english with appropriate syntax and vocabulary  Fund of Knowledge: appropriate  Muscle Strength and Tone: normal  Gait and Station: Normal         Physical Exam:     See ED assessment note by Dr. Hernandez on  01/02/19        Assessment   This patient is a 23 year old male with history of MDD, ADHD, GRACIA who presented to [facility] ED on 01/01/19 with suicidal ideation and ingestion of 5 bars of Xanax. Patient was then transferred to a medical floor for " observation after syncopal episode.  He was medically cleared and transferred to station 20 on 01/04/19. The patient's last psychiatric hospitalization was in 2016 at [facility].   Current psychosocial stressors include family relationships which he has been coping with by substance resulting in suicidal attempts.  The MSE is notable for young man with passive suicidal ideation. The patient's reported symptoms of depression and suicidal ideation are consistent with historic diagnosis of Major Depressive Disorder. Additionally, the patient has traits of substance abuse which interfere with him ability to adhere with the treatment plan.  Patient was not on any prior to admission medications.  He was interested in starting an antidepressant and had stated Zoloft worked well for him in the past.  Zoloft 50mg at at bedtime has started on admission.  Given that he is actively suicidal, patient warrants inpatient psychiatric hospitalization to maintain his safety. Disposition pending clinical stabilization, medication optimization and development of an appropriate discharge plan.    Reason for inpatient hospitalization is suicidal ideation. Disposition pending clinical stabilization, medication optimization, and formulation of safe discharge plan.          Plan   Admit to Unit 20 with Attending Physician Dr. Michelet Vila  Legal Status: Voluntary    Safety Assessment:      Pt has not required locked seclusion or restraints in the past 24 hours to maintain safety, please refer to RN documentation for further details.    Precautions: Suicide, self-injury, withdrawal    Principal psychiatric diagnosis:   # Major Depressive Disorder    Secondary psychiatric diagnoses:   # Polysubstance abuse     Medications:   Outpatient medications held:    None    Outpatient medications continued:  No PTA medications    New medications initiated:   - Zoloft 50mg daily for depression     - Patient will be treated in therapeutic milieu with  appropriate individual and group therapies.  - medications as above    Medical diagnoses:    None    Consult: None at this time    Labs:  - TSH, CBC, CK, Troponin, BMP wnl   - UTox positive for Cocaine and Cannabinoids  - B12, Folate, lipid panel, vitamin D pending for AM      Disposition:  To be determined pending clinical stabilization, medication optimization, and appropriate disposition planning.     -------------------------------------------------------  This patient was seen by me and will be staffed with the attending physician in the morning.   Dr. Hallie Rey DO, MBA, MS  PGY-1 Psychiatry Resident Physician       Attestation: Physician Attestation   I, Che Connolly, was present with the medical student who participated in the service and in the documentation of the note.  I have verified the history and personally performed the physical exam and medical decision making.  I agree with the assessment and plan of care as documented in the note.      I personally reviewed medications and hospital records. I personally interviewed patient and agree with above assessment and recommendations.    Patient currently feeling rested although had a disrupted sleep. Appears to be estranged from family and without any clear plans for treatment upon discharge. Patient is interested in 'getting the help needed' and willing to wait until Monday for the team to set up his outpatient treatment plan.     Che Connolly MD  Date of Service (when I saw the patient): 01/05/19

## 2019-01-04 NOTE — PROGRESS NOTES
0800:    Vitals terrance, but stable. MD ordering an EKG to monitor  Plans to have breakfast. OK to discharge fluids if tolerating PO  Ambulatory and steady on his feet. Will obtain orthostatic BP  Psych and SW following to develop a safe discharge plan    1000:  VSS, terrance but stable  Ate 100% of his breakfast. IV fluids discontinued  Ambulatory - not feeling dizzy, light headed, or unsteady. Orthostatic BP stable  Psych and SW following to develop a safe discharge plan to inpatient mental health    1200:  Goals met - plan to discharge to inpatient mental health, Station 20, at 1400

## 2019-01-05 LAB
CHOLEST SERPL-MCNC: 176 MG/DL
FOLATE SERPL-MCNC: 18.7 NG/ML
HCT VFR BLD AUTO: 51.2 % (ref 40–53)
HDLC SERPL-MCNC: 47 MG/DL
LDLC SERPL CALC-MCNC: 105 MG/DL
NONHDLC SERPL-MCNC: 129 MG/DL
TRIGL SERPL-MCNC: 119 MG/DL
VIT B12 SERPL-MCNC: 461 PG/ML (ref 193–986)

## 2019-01-05 PROCEDURE — 25000132 ZZH RX MED GY IP 250 OP 250 PS 637: Performed by: PSYCHIATRY & NEUROLOGY

## 2019-01-05 PROCEDURE — 12400001 ZZH R&B MH UMMC

## 2019-01-05 PROCEDURE — 36415 COLL VENOUS BLD VENIPUNCTURE: CPT | Performed by: PSYCHIATRY & NEUROLOGY

## 2019-01-05 PROCEDURE — 82746 ASSAY OF FOLIC ACID SERUM: CPT | Performed by: PSYCHIATRY & NEUROLOGY

## 2019-01-05 PROCEDURE — 80061 LIPID PANEL: CPT | Performed by: PSYCHIATRY & NEUROLOGY

## 2019-01-05 PROCEDURE — 82607 VITAMIN B-12: CPT | Performed by: PSYCHIATRY & NEUROLOGY

## 2019-01-05 PROCEDURE — 85014 HEMATOCRIT: CPT | Performed by: PSYCHIATRY & NEUROLOGY

## 2019-01-05 RX ADMIN — NICOTINE POLACRILEX 2 MG: 2 LOZENGE ORAL at 16:25

## 2019-01-05 RX ADMIN — SERTRALINE HYDROCHLORIDE 50 MG: 50 TABLET ORAL at 09:33

## 2019-01-05 ASSESSMENT — ACTIVITIES OF DAILY LIVING (ADL)
LAUNDRY: WITH SUPERVISION
DRESS: STREET CLOTHES
ORAL_HYGIENE: INDEPENDENT
HYGIENE/GROOMING: INDEPENDENT

## 2019-01-05 NOTE — PROGRESS NOTES
Pt s mood was calm and had full range of affect. He was social with others. He was out watched TV and played card with others. He told this writer that his mood had improved since admitted to unit. He stated he had no SI or SIB currently. He denied having auditory or visual hallucination.

## 2019-01-05 NOTE — PROGRESS NOTES
"  Initial Psychosocial Assessment    I have reviewed the chart, met with the patient, and developed Care Plan. Information for assessment was obtained from: Pt, medical record      Presenting Problem:   Per H&P: This patient is a 23 year old male with who presented to [facility] ED on 01/02/19 for suicidal ideation and attempt via ingestion of \"5 bars of Xanax\".  At the time he took the Xanax he was standing on a bridge ready to jump when his friend pulled him off and called the  for an ambulance to take him to the ED.  In the ED, he denied other ingestion but admitted to regular use of alcohol, marijuana, cocaine, LSD, Xanax, and tobacco.  He states he typically uses these drugs on the weekends.  Patient stated he has been depressed and wanting to kill himself for the past three years.   He was cleared for admission to a psychiatry floor from the ED.  However, once he arrived on the psychiatry floor he fainted and would not respond to his name.  He was unresponsive for two minutes. A code Blue was called and patient was transferred to medicine unit 10A.       During interview patient stated he does not feel he is chemically dependent. The only drug he uses on a regular basis is marijuana. He would like to get back on medication and is willing to see a therapist. He does not have insurance.         History of Mental Health and Chemical Dependency:  [facility]: 2016 and 2015  In the ED after right eye of adderal and was discharged.    Patient uses alcohol, marijuana, cocaine,LSD, xanax on a regular basis.  No prior CD treatments    Significant Life Events   (Illness, Abuse, Trauma, Death): none    Family: Grew up with mother, stepfather and two brother in the [place]. He doesn't have contact with bio dad. Never  and no children    Living Situation: lives with five roommates       Educational Background: HS graduate       Financial Status: works as a [employer] service at [employer]    Legal Issues:  " None    Ethnic/Cultural Issues:     Spiritual Orientation:  none     Service History: none    Social Functioning (organization, interests): likes working and execise    Current Treatment Providers are:    none    Social Service Assessment/Plan:   Provide safe environment and manage medications per psychiatry. CTC to refer pt to the business office for insurance. CTC to meet with patient to discuss discharge planning. Staff to provide safe environment and observe for behavior changes.

## 2019-01-06 PROCEDURE — 25000132 ZZH RX MED GY IP 250 OP 250 PS 637: Performed by: PSYCHIATRY & NEUROLOGY

## 2019-01-06 PROCEDURE — 12400001 ZZH R&B MH UMMC

## 2019-01-06 PROCEDURE — G0177 OPPS/PHP; TRAIN & EDUC SERV: HCPCS

## 2019-01-06 RX ADMIN — HYDROXYZINE HYDROCHLORIDE 25 MG: 25 TABLET ORAL at 22:38

## 2019-01-06 RX ADMIN — SERTRALINE HYDROCHLORIDE 50 MG: 50 TABLET ORAL at 08:45

## 2019-01-06 ASSESSMENT — MIFFLIN-ST. JEOR: SCORE: 1759.68

## 2019-01-06 ASSESSMENT — ACTIVITIES OF DAILY LIVING (ADL)
HYGIENE/GROOMING: INDEPENDENT
DRESS: STREET CLOTHES
ORAL_HYGIENE: INDEPENDENT
LAUNDRY: WITH SUPERVISION

## 2019-01-06 NOTE — PROGRESS NOTES
Pt observed in the lounge and he had company this shift. He denies suicidal ideation/hallucinations.  He appears social  With others and cooperative on approach. He did shower and had both meal with no problems.      01/06/19 1400   Behavioral Health   Hallucinations denies / not responding to hallucinations   Thinking intact   Orientation time: oriented;date: oriented;place: oriented;person: oriented   Memory baseline memory   Insight insight appropriate to situation   Judgement intact   Eye Contact at examiner   Affect full range affect   Mood mood is calm   Physical Appearance/Attire appears stated age   Hygiene well groomed   1. Wish to be Dead No   2. Non-Specific Active Suicidal Thoughts  No   Self Injury other (see comment)  (Denies)   Activity other (see comment)  (Social with others)   Speech clear;coherent   Psychomotor / Gait balanced;steady   Psycho Education   Type of Intervention 1:1 intervention   Response participates, initiates socially appropriate   Hours 0.5   Activities of Daily Living   Hygiene/Grooming independent   Oral Hygiene independent   Dress street clothes   Laundry with supervision   Room Organization independent   Activity   Activity Assistance Provided independent

## 2019-01-06 NOTE — PROGRESS NOTES
Pt s mood was calm and had full range of affect. He was social with others and played cards with others in the dining area. He denied having SI or SIB. He denied having auditory or visual hallucination. His friend was here to visit. The visit went well. His MSSA was 1.

## 2019-01-07 PROCEDURE — 25000132 ZZH RX MED GY IP 250 OP 250 PS 637: Performed by: PSYCHIATRY & NEUROLOGY

## 2019-01-07 PROCEDURE — 12400001 ZZH R&B MH UMMC

## 2019-01-07 PROCEDURE — G0177 OPPS/PHP; TRAIN & EDUC SERV: HCPCS

## 2019-01-07 PROCEDURE — 99232 SBSQ HOSP IP/OBS MODERATE 35: CPT | Mod: GC | Performed by: PSYCHIATRY & NEUROLOGY

## 2019-01-07 RX ADMIN — TRAZODONE HYDROCHLORIDE 50 MG: 50 TABLET ORAL at 22:04

## 2019-01-07 RX ADMIN — SERTRALINE HYDROCHLORIDE 50 MG: 50 TABLET ORAL at 09:13

## 2019-01-07 ASSESSMENT — ACTIVITIES OF DAILY LIVING (ADL)
DRESS: STREET CLOTHES
HYGIENE/GROOMING: INDEPENDENT
LAUNDRY: WITH SUPERVISION
DRESS: SCRUBS (BEHAVIORAL HEALTH);STREET CLOTHES
ORAL_HYGIENE: INDEPENDENT
HYGIENE/GROOMING: INDEPENDENT
ORAL_HYGIENE: INDEPENDENT
LAUNDRY: WITH SUPERVISION

## 2019-01-07 NOTE — PROGRESS NOTES
Pt s mood was calm and had full range of affect. He was social with others. He was out watched TV, plated video games and played card with others. His friends were here to visit. The visit went well. His depressive mood had diminished.   I feel fabulous. I feel great.  He stated he had no SI or SIB currently. He denied having auditory or visual hallucination. His MSSA was 1. He participated in Mindfulness group this evening.

## 2019-01-07 NOTE — PLAN OF CARE
"Patient attended weekend OT group on 1/6/19. Group emphasized coping, mindfulness, and movement. He was enthusiastic, stating he was \"so happy today\" unable to share why. He was engaged in group, participating fully. He was talkative with other group members, but perceptive to social cues to listen. He brought enthusiastic energy to group and used humor. He answered discussion questions thoughtfully.   "

## 2019-01-07 NOTE — PROGRESS NOTES
"   01/07/19 1100   Behavioral Health   Hallucinations denies / not responding to hallucinations   Thinking intact   Orientation date: oriented;time: oriented;place: oriented;person: oriented   Memory baseline memory   Insight insight appropriate to situation   Judgement impaired   Eye Contact at examiner   Affect blunted, flat   Mood mood is calm   Physical Appearance/Attire attire appropriate to age and situation   Hygiene neglected grooming - unclean body, hair, teeth   Suicidality (denied )   1. Wish to be Dead No   2. Non-Specific Active Suicidal Thoughts  No   Self Injury (denied )   Elopement (dewnied )   Activity (attending groups )   Speech clear;coherent   Medication Sensitivity no observed side effects;no stated side effects   Psychomotor / Gait balanced   Psycho Education   Type of Intervention 1:1 intervention   Response participates, initiates socially appropriate   Hours 0.5   Activities of Daily Living   Hygiene/Grooming independent   Oral Hygiene independent   Dress scrubs (behavioral health);street clothes   Laundry with supervision   Room Organization independent   Activity   Activity Assistance Provided independent     Pt was calm with blunted mood affect. He was cooperative with personal care, unit protocols and expectations. He was in milieu for the majority part of the shift pacing in hallway, and socializing with peers. He is attending groups and participating. He stated that he is feeling \"good\" and the medication seems to be helping. He denied all mental health symptoms including SI/SIB. He is hoping to discharge near the future. Appetite was good and sleeping and napping well.   "

## 2019-01-07 NOTE — DISCHARGE INSTRUCTIONS
Behavioral Discharge Planning and Instructions    Summary:   You were admitted to Station 20 on 1/4/19  with worsening depression, suicidal ideation and overdose on Xanax under the care of Dr. Vila.  You met with Dr. Vila and his team daily for ongoing psychiatric assessment and medication management.  You had opportunities to participate in therapeutic groups on the unit.   At this time you report your mood is stabilizing and you report you are not having thoughts or intent to harm yourself or others. You will be discharged home and will resume care with your outpatient providers.    Disposition:  Home    Main Diagnosis:   Major Depressive Disorder  Cannabis Use Disorder    Major Treatments, Procedures and Findings:   Medications were  managed throughout your stay. An internal medicine consult was completed during your stay. You had the opportunity to participate in treatment programming while on the unit including occupational therapy, mental health support and education and spiritual services.     Symptoms to Report:   Please report if you are experiencing increased aggression and/or confusion, problematic loss of sleep, worsening mood, or thoughts of suicide to your treatment team or notify your primary provider.   IF THE SYMPTOMS YOU ARE EXPERIENCING ARE A MEDICAL EMERGENCY, CALL 911 IMMEDIATELY    Lifestyle Adjustment:   1. Adjust your lifestyle to get enough sleep, relaxation, exercise and good nutrition.  Continue to develop healthy coping skills to decrease stress and promote a healthy and sober lifestyle.  2. Abstain from all substances of abuse.  3. Take medications as prescribed.  Please work with your doctor to discuss any concerns you have with your medications or side effects you may be experiencing.  4. Follow up with appointments as scheduled.        Psychiatry Follow-up:   Patient will need to call to schedule an appointment with both Psychiatry and therapy.    [facility]: [address] [facility] [place] [address]  Phone: [phone]    [facility]: [address] [place] [address]  Phone: [phone]  [facility] [address]  [address]    [place] [address]  Phone: [phone]     Resources:   *[facility] Crisis: COPE: ([phone]) 24 hour mobile crisis support for people having a mental health crisis in [facility].   *Acute Psychiatric Services ([phone]). 24-hour walk-in crisis psychiatric support at [facility]; Emergency Medications Clinic available 7:30am - 2:00pm  *Mdfq9wgus: Text  \"life\"  to [phone]   A trained crisis counselor will respond immediately  *[facility]: ([phone])  24-hour confidential telephone counseling   *[facility] Emergency Room: [phone]      General Medication Instructions:   See your medication sheet(s) for instructions.   Take all medicines as directed.  Make no changes unless your doctor suggests them.   Go to all your doctor visits.  Be sure to have all your required lab tests. This way, your medicines can be refilled on time.  Do not use any drugs not prescribed by your doctor.    The treatment team has appreciated the opportunity to work with you.  We wish you the best in the future.    If you have any questions or concerns our unit number is [phone].     "

## 2019-01-07 NOTE — PLAN OF CARE
BEHAVIORAL TEAM DISCUSSION    Participants:   Dr. Vila, Dr. Rey, Dr. Muro, Gi Garsia MA.Mercy KAY RN    Continued Stay Criteria/Rationale:   S/P Overdose     Medical/Physical:   Stable      Precautions:   Behavioral Orders   Procedures     Code 1 - Restrict to Unit     Routine Programming     As clinically indicated     Self Injury Precaution     Status 15     Every 15 minutes.     Suicide precautions     Patients on Suicide Precautions should have a Combination Diet ordered that includes a Diet selection(s) AND a Behavioral Tray selection for Safe Tray - with utensils, or Safe Tray - NO utensils       Withdrawal precautions     Plan:  Patient will have ongoing psychiatric assessment.  Medications will be reviewed and adjusted per MD as indicated.  Family will be contacted for care coordination.  Hospital staff will provide a safe environment and a therapeutic milieu. Patient will be encouraged to participate in unit groups and activities.   Patient will need referrals for both psychiatry and therapy.  CTC will continue to assess needs and  ensure appropriate follow up care is in place.       Rationale for change in precautions or plan:   No change in plan/precautions

## 2019-01-07 NOTE — PROGRESS NOTES
"    ----------------------------------------------------------------------------------------------------------  [facility], [place]   Psychiatric Progress Note     Interim History:   The patient's care was discussed with the treatment team and chart notes were reviewed.     Sleep: None documented   Scheduled meds: adherent  PRN meds: Hydroxyzine x1 on Saturday     Per Staff report: Patient reports he \"feels great and fabulous\", he has been calm, social, and appropriate on the unit    Patient interview:  Hermes was interviewed in the conference room today.  He stated he is feeling \"much better\".  He reports he had a lot of visitors over the weekend.  He has reached out to his family a few times with no response from them.  This bothers him, but he is attempting to move on and not let it bother him.  He reports he just wants to \"focus on myself right now\".  He denies any side effects from the medications.  He is interested in referrals to a therapist and a psychiatrist.   He currently does not have insurance and is interested in meeting with someone from the business office today to apply for medical assistance.  He does not believe he has a drug problem and is not interested in pursuing chemical dependency treatment at this time.  He denies SI, SIB, HI.     Collateral information obtained from friend William Allen:  William states that he was able to come visit Hermes on Saturday and Sunday.  He reports William is appearing brighter than he was last week.  He does not have any acute concerns about Hermes leaving the hospital.  He thinks having a thorough discharge plan is important for Hermes as he \"flouders\" when he does not have a plan.   William does not think chemical dependency treatment is necessary because he thinks Hermes only uses drugs when he is depressed.  William reports there is a lot of stress due to Hermes's mother: specifically he can not get loans for school because his mother will not obtain her tax " returns.      The risks, benefits, alternatives and side effects of any medication changes have been discussed and are understood by the patient and other caregivers.    Psychiatric Review of systems:     The Review of Systems is negative other than noted in the HPI         Psychiatric Examination:   BP (!) 127/92 (BP Location: Right arm)   Pulse 58   Temp 98.1  F (36.7  C) (Oral)   Resp 16   Ht 1.829 m (6')   Wt 71.7 kg (158 lb)   SpO2 97%   BMI 21.43 kg/m    Weight is 158 lbs 0 oz  Body mass index is 21.43 kg/m .    Appearance:  awake, alert, dressed in hospital scrubs and appeared as age stated  Attitude:  cooperative  Eye Contact:  good, intense  Mood:  good  Affect:  appropriate and in normal range  Speech:  clear, coherent  Psychomotor Behavior:  no evidence of tardive dyskinesia, dystonia, or tics  Thought Process:  logical, linear and goal oriented  Associations:  no loose associations  Thought Content:  no evidence of suicidal ideation or homicidal ideation, no evidence of psychotic thought, no auditory hallucinations present and no visual hallucinations present  Insight:  fair  Judgment:  fair  Oriented to:  time, person, and place  Attention Span and Concentration:  intact  Recent and Remote Memory:  intact  Language: Able to name objects, Able to repeat phrases and Able to read and write  Fund of Knowledge: appropriate  Muscle Strength and Tone: normal  Gait and Station: Normal     Assessment    Diagnostic Impression: This patient is a 23 year old male with history of MDD, ADHD, GRACIA who presented to [facility] [place] ED on 01/01/19 with suicidal ideation and ingestion of 5 bars of Xanax. Patient was then transferred to a medical floor for observation after syncopal episode.  He was medically cleared and transferred to station 20 on 01/04/19. The patient's last psychiatric hospitalization was in 2016 at [facility] [place].   Current psychosocial stressors include family relationships which he has been coping with by  substance resulting in suicidal attempts.  The MSE is notable for young man with passive suicidal ideation. The patient's reported symptoms of depression and suicidal ideation are consistent with historic diagnosis of Major Depressive Disorder. Additionally, the patient has traits of substance abuse which interfere with him ability to adhere with the treatment plan.      Hospital course: Hermes Allen was admitted to station 20 as a voluntary patient. He was not on any prior to admission medications.  He was interested in starting an antidepressant and had stated Zoloft worked well for him in the past.  Zoloft 50mg at at bedtime has started on admission.  Given that he is actively suicidal, patient warrants inpatient psychiatric hospitalization to maintain his safety. Disposition pending clinical stabilization, medication optimization and development of an appropriate discharge plan.      Medical course: Hermes Allen was medically cleared by the ED prior to admission to the unit.     Plan     Today's Changes:   - Continue medication regimen  - Work with OT to discuss outpatient schedule     Principal psychiatric diagnosis:   # Major Depressive Disorder     Secondary psychiatric diagnoses:   # Polysubstance abuse     Medical diagnoses to be addressed this admission:    None    Medications:   Zoloft 50mg daily for depression       Plan:   - Patient will be treated in therapeutic milieu with appropriate individual and group therapies as described.    Legal Status: Voluntary    Safety Assessment:   Behavioral Orders   Procedures     Code 1 - Restrict to Unit     Routine Programming     As clinically indicated     Self Injury Precaution     Status 15     Every 15 minutes.     Suicide precautions     Patients on Suicide Precautions should have a Combination Diet ordered that includes a Diet selection(s) AND a Behavioral Tray selection for Safe Tray - with utensils, or Safe Tray - NO utensils       Withdrawal precautions          Disposition:  To be determined pending clinical stabilization, medication optimization, and appropriate disposition planning.     This patient was seen and discussed with my attending physician.   Dr. Hallie Rey DO, MBA, MS  PGY-1 Psychiatry Resident Physician   -----------------------------------------------------------------------------    Attending Physician Attestation:  I, Michelet Vila, saw and evaluated the patient with the resident physician.  I agree with the findings and plan of care as documented in the resident note.  I have reviewed all labs and vital signs.

## 2019-01-07 NOTE — PLAN OF CARE
General Plan of Care (Inpatient Behavioral)  Individualization/Patient Specific Goal (IP Behavioral)  Description  The patient and/or their representative will achieve their patient-specific goals related to the plan of care.    The patient-specific goals include:  1/7/2019 0927 by Gi Garsia LP  Flowsheets  Taken 1/7/2019 0927   Patient Strengths  Steady employment;Awareness of substance use issues;Utilized support systems;Has vocational interests i.e., hobbies;Stable housing;Motivated and ready for change;Adherent to medication regime  Note  Patient's goals:  Feel more stable  Medication mgmt    Plan for admission:  1. Stabilization of mood disorder symptoms  2. Absence of SI- safe with self  3. Medication management per MD's  4. Safe withdrawal from substances complete  5. Coordination of care with outpatient providers, family  6. Substance use addressed  7. Psychiatric follow up care in place

## 2019-01-08 VITALS
WEIGHT: 158 LBS | SYSTOLIC BLOOD PRESSURE: 141 MMHG | HEART RATE: 61 BPM | BODY MASS INDEX: 21.4 KG/M2 | HEIGHT: 72 IN | RESPIRATION RATE: 16 BRPM | OXYGEN SATURATION: 97 % | DIASTOLIC BLOOD PRESSURE: 88 MMHG | TEMPERATURE: 97.9 F

## 2019-01-08 LAB — INTERPRETATION ECG - MUSE: NORMAL

## 2019-01-08 PROCEDURE — 25000132 ZZH RX MED GY IP 250 OP 250 PS 637: Performed by: PSYCHIATRY & NEUROLOGY

## 2019-01-08 PROCEDURE — G0177 OPPS/PHP; TRAIN & EDUC SERV: HCPCS

## 2019-01-08 PROCEDURE — 99238 HOSP IP/OBS DSCHRG MGMT 30/<: CPT | Mod: GC | Performed by: PSYCHIATRY & NEUROLOGY

## 2019-01-08 RX ADMIN — SERTRALINE HYDROCHLORIDE 50 MG: 50 TABLET ORAL at 08:47

## 2019-01-08 ASSESSMENT — ACTIVITIES OF DAILY LIVING (ADL)
LAUNDRY: WITH SUPERVISION
DRESS: INDEPENDENT
ORAL_HYGIENE: INDEPENDENT
HYGIENE/GROOMING: INDEPENDENT

## 2019-01-08 ASSESSMENT — MIFFLIN-ST. JEOR: SCORE: 1759.68

## 2019-01-08 NOTE — PLAN OF CARE
"  OT General Care Plan  OT Goal 1  Description  Pt will complete self-assessment within 3 days of initial attendance to OT group.     INITIAL OT NOTE  Subjective  Overall was bright and eager to engage in therapeutic programming.     Objective  Attended 3 hour(s) of occupational therapy this date.     Pt actively participated in a mental health management group with a focus on coping through movement to facilitate relaxation and stress management via floor yoga. Pt followed and engaged in all yoga poses, and verbalized beneficial response to practice as \"it was calming\". Reported intent to continue with an emphasis on meditation and breathing exercises upon discharge.     Pt participated in a collaborative multi-step hands-on meal preparation group: baking/decorating cookies and leisure game. Education was provided on cooking/baking as a significant occupation for role and routine fulfillment, delayed gratification, socialization, and nutrition for increased mental health. Demonstrated good process, performance, and social interaction skills, specifically teamwork and patience.     Assessment  Pt would benefit from continued engagement in OT groups that support healthy recovery, specifically exploration of positive coping skills for symptom management/relapse prevention.     Plan  Provide personally meaningful graded occupation-based activities and ongoing assessment. As group attendance is established, Pt will be given self-assessment form and OT will explain the purpose of including them in their treatment plan and offer options for meeting their needs.    1/8/2019 0749 - Improving by Ena Cox, OT     "

## 2019-01-08 NOTE — PROGRESS NOTES
"Patient reports feeling \"great\", denies SI/SIB. Patient states his thinking is clear and his mood has been dramatically improved over the past few days. Patient states he is looking forward to discharging tomorrow and states he has no current health concerns. Patient was visible and social with full range affect.     01/07/19 2200   Behavioral Health   Hallucinations denies / not responding to hallucinations   Thinking intact   Orientation person: oriented;place: oriented;date: oriented;time: oriented   Memory baseline memory   Insight insight appropriate to situation   Judgement impaired   Eye Contact at examiner   Affect full range affect   Mood elated   Physical Appearance/Attire appears stated age;attire appropriate to age and situation   Hygiene well groomed   Suicidality other (see comments)  (Denies)   1. Wish to be Dead No   2. Non-Specific Active Suicidal Thoughts  No   Self Injury other (see comment)  (Denies)   Activity other (see comment)  (Visible and social.)   Speech clear;coherent   Medication Sensitivity no stated side effects;no observed side effects   Psychomotor / Gait balanced;steady   Activities of Daily Living   Hygiene/Grooming independent   Oral Hygiene independent   Dress street clothes   Laundry with supervision   Room Organization independent     "

## 2019-01-08 NOTE — PLAN OF CARE
OCCUPATIONAL THERAPY ENCOUNTER     Writer met with Pt regarding discharge planning, specifically continued engagement in healthy and meaningful occupations that support healthy recovery. Was also educated on work/leisure balance and provided with a time-usage tip sheet, which Pt was agreeable to review. Pt explored positive coping skills for symptom management/relapse prevention.     Pt was I to complete ~60% of daily/weekly routine development schedule with self-identified meaningful occupations: exercise, work, learning the guitar, socializing with friends, and meditation prior to sleep.  Pt was then pulled away from encounter by unit staff and unable to return. Writer will follow-up prior to discharge today.     Ena Cox OT on 1/8/2019 at 7:59 AM

## 2019-01-08 NOTE — PROGRESS NOTES
"Patient alert and oriented to person, place, time and situation. Stated mood is \"happy and excited for discharge. Affect bright, full range and congruent with stated mood. He denied SI or self harm ideation. Orders for discharge placed and patient agrees with plan to discharge to home. Has applied to [facility] for insurance with plan to follow with outpatient psychiatry once insurance in place. Family friend picking patient up from hospital. States he feels he is doing well on current dose of sertraline and denied any SEs. Printed information for sertraline given. Discharged to home with friend at this time. All belongings checked and sent.     "

## 2019-01-09 NOTE — DISCHARGE SUMMARY
"    ----------------------------------------------------------------------------------------------------------  [facility], [place]   Discharge Summary      Hermes Allen MRN# [id number]   Age: 23 year old YOB: 1995     Date of Admission:  1/4/2019  Date of Discharge:  1/8/2019  1:38 PM  Admitting Physician:  Che Connolly MD  Discharge Physician:  Michelet Vila MD         Event Leading to Hospitalization:   This patient is a 23 year old male with who presented to [facility] ED on 01/02/19 for suicidal ideation and attempt via ingestion of \"5 bars of Xanax\".  At the time he took the Xanax he was standing on a bridge ready to jump when his friend pulled him off and called the  for an ambulance to take him to the ED.  In the ED, he denied other ingestion but admitted to regular use of alcohol, marijuana, cocaine, LSD, Xanax, and tobacco.  He states he typically uses these drugs on the weekends.  Patient stated he has been depressed and wanting to kill himself for the past three years.   He was cleared for admission to a psychiatry floor from the ED.  However, once he arrived on the psychiatry floor he fainted and would not respond to his name.  He was unresponsive for two minutes. A code Blue was called and patient was transferred to medicine unit 10A.      This writer spoke with Medicine provider (Dr. Haynes) who stated patient was somnolent and unsteady on his feet for the first 24 hours he was in on the medical unit.  However, his MSSA scores were 1 and on 01/04 he was able to ambulate independently and was medically cleared.  Dr. Vanegas saw the patient for a psychiatry consult on 01/03/19 and noted inpatient admission for suicidal ideation was the most appropriate course of action.      During my interview with the patient on 01/04/19 he endorsed that the above history is correct.  He stated that he was feeling pretty good until Wednesday night when \"everything got shitty\". "  He states he has periods of time when he feels okay but then his depression will become too much to bear and he becomes suicidal. He endorses a significant history of suicide attempts dating back for the past 4-5 years including overdose attempts, alcohol poisoning, and carbon monoxide poisoning.  He states he felt good on Zoloft when he was taking it but stopped taking it because he felt like he didn't need it anymore, he has not taken it in over a year.  He states he is interested in starting it again during this admission.        See Admission note by Dr. Connolly on 1/4 for additional details.          Diagnoses:     PRINCIPAL DIAGNOSIS:  MDD, recurrent, severe    SECONDARY DIAGNOSES: Polysubstance abuse, ongoing         Labs:   Utox positive for THC and cocaine, otherwise negative  Please see Dr. Haynes's medical discharge summary from 1/4 for details of medical admission           Consults:   No consultations were requested during this admission         Hospital Course:   Diagnostic Assessment: This patient is a 23 year old male with history of MDD, ADHD, GRACIA who presented to [facility] ED on 01/01/19 with suicidal ideation and ingestion of 5 bars of Xanax. Patient was then transferred to a medical floor for observation after syncopal episode.  He was medically cleared and transferred to station 20 on 01/04/19. The patient's last psychiatric hospitalization was in 2016 at [facility].   Current psychosocial stressors include family relationships which he has been coping with by substance resulting in suicidal attempts.  The MSE is notable for young man with passive suicidal ideation. The patient's reported symptoms of depression and suicidal ideation are consistent with historic diagnosis of Major Depressive Disorder. Additionally, the patient has traits of substance abuse which interfere with him ability to adhere with the treatment plan.      Hospital course: Hermes Allen was admitted to station 20 as a voluntary patient.  "He was not on any prior to admission medications.  He was interested in starting an antidepressant and had stated Zoloft worked well for him in the past.  Zoloft 50mg at at bedtime has started on admission, and the patient tolerated this dose with no adverse effects. The team spoke with his close friend for collateral. He met with Financial Services to apply for health insurance. His symptoms of low mood and SI improved, and he was free of active SI for several days prior to discharge. On the day of discharge, he described his mood as \"great,\" and was looking forward to returning to work.     Hermes Allen was discharged to home in the care: of his friend, with a 1-month supply of medications. At the time of discharge Hermes Allen was determined to not be a danger to himself or others.     Suicide risk assessment if applicable Today Hermes Allen reports no SI. In addition, Hermes Allen has notable risk factors for self-harm, including age, anxiety, substance abuse and previous suicide attempts. However, risk is mitigated by ability to volunteer a safety plan and history of seeking help when needed. Additional steps taken to minimize risk include: patient provided with follow-up and crisis numbers. Therefore, based on all available evidence including the factors cited above, Hermes Allen does not appear to be at imminent risk for self-harm, and is appropriate for outpatient level of care.     This document serves as a transfer of care to Hermes Allen's outpatient providers.         Discharge Medications:   Sertraline 50 mg PO daily         Psychiatric Examination:   /88 (BP Location: Right arm)   Pulse 61   Temp 97.9  F (36.6  C) (Oral)   Resp 16   Ht 1.829 m (6')   Wt 71.7 kg (158 lb)   SpO2 97%   BMI 21.43 kg/m    Alertness: alert  and oriented  Appearance: Thin young [place]auasian man, adequately groomed, casually dressed  Behavior/Demeanor: cooperative and pleasant, with good  eye contact (intense at " "times)  Speech: regular rate and rhythm, no articulation problems  Language: intact  Psychomotor: normal or unremarkable  Mood:  \"great\"  Affect: Euthymic, full range; was congruent to mood; was congruent to content  Thought Process/Associations: unremarkable  Thought Content: denies suicidal and violent ideation  Perception: denies AH/VH  Insight: adequate  Judgment: adequate for safety  Oriented to:  time, person, and place  Attention Span and Concentration:  adequate for interview  Recent and Remote Memory:  intact  Cognition: does appear grossly intact; formal cognitive testing was not done          Discharge Plan:   Psychiatry Follow-up:   Patient will need to call to schedule an appointment with both Psychiatry and therapy.     [facility]: [address] [place] [address]  Phone: [phone]    [facility]: [address] [place] [address]  Phone: [phone]  [facility] [address] [place] [address]  [address]    [place] [address]  Phone: [phone]      Resources:   *[facility]: [facility]: ([phone]) 24 hour mobile crisis support for people having a mental health crisis in [facility].   *[facility] ([phone]). 24-hour walk-in crisis psychiatric support at [facility]; Emergency [facility] available 7:30am - 2:00pm  *[facility]: Text  \"life\"  to [address]14   A trained crisis counselor will respond immediately  *[facility]: ([phone])  24-hour confidential telephone counseling   *[facility] Emergency Room: [phone]       Pt seen and discussed with my attending, MD Gauri Lang MD  PGY-2 Psychiatry Resident      Attestation:  IMichelet, saw and evaluated the patient with the resident physician.  I agree with the findings and plan of care as documented in the resident note.  I have reviewed all labs and vital signs.  I, Michelet Vila, " have reviewed this summary and agree with the findings and discharge plan as written.
